# Patient Record
Sex: MALE | Race: WHITE | NOT HISPANIC OR LATINO | Employment: FULL TIME | URBAN - METROPOLITAN AREA
[De-identification: names, ages, dates, MRNs, and addresses within clinical notes are randomized per-mention and may not be internally consistent; named-entity substitution may affect disease eponyms.]

---

## 2023-04-12 ENCOUNTER — TELEPHONE (OUTPATIENT)
Dept: PRIMARY CARE | Facility: CLINIC | Age: 28
End: 2023-04-12

## 2023-04-12 DIAGNOSIS — J45.20 MILD INTERMITTENT COLD-INDUCED ASTHMA WITHOUT COMPLICATION (HHS-HCC): Primary | ICD-10-CM

## 2023-04-12 PROBLEM — J45.909: Status: ACTIVE | Noted: 2023-04-12

## 2023-04-12 RX ORDER — ALBUTEROL SULFATE 90 UG/1
2 AEROSOL, METERED RESPIRATORY (INHALATION) EVERY 4 HOURS PRN
Qty: 8 G | Refills: 0 | Status: SHIPPED | OUTPATIENT
Start: 2023-04-12 | End: 2024-04-30 | Stop reason: SDUPTHER

## 2023-06-20 DIAGNOSIS — F41.9 ANXIETY: Primary | ICD-10-CM

## 2023-06-20 RX ORDER — VENLAFAXINE HYDROCHLORIDE 37.5 MG/1
1 CAPSULE, EXTENDED RELEASE ORAL DAILY
COMMUNITY
Start: 2023-03-29 | End: 2023-06-20 | Stop reason: SDUPTHER

## 2023-06-20 RX ORDER — VENLAFAXINE HYDROCHLORIDE 37.5 MG/1
37.5 CAPSULE, EXTENDED RELEASE ORAL DAILY
Qty: 90 CAPSULE | Refills: 1 | Status: SHIPPED | OUTPATIENT
Start: 2023-06-20 | End: 2023-08-16 | Stop reason: SDUPTHER

## 2023-06-21 DIAGNOSIS — J45.20 MILD INTERMITTENT COLD-INDUCED ASTHMA WITHOUT COMPLICATION (HHS-HCC): ICD-10-CM

## 2023-06-21 DIAGNOSIS — F41.9 ANXIETY: ICD-10-CM

## 2023-06-21 RX ORDER — ALBUTEROL SULFATE 90 UG/1
2 AEROSOL, METERED RESPIRATORY (INHALATION) EVERY 4 HOURS PRN
Qty: 8 G | Refills: 0 | Status: CANCELLED | OUTPATIENT
Start: 2023-06-21 | End: 2024-06-20

## 2023-06-21 RX ORDER — VENLAFAXINE HYDROCHLORIDE 37.5 MG/1
37.5 CAPSULE, EXTENDED RELEASE ORAL DAILY
Qty: 90 CAPSULE | Refills: 1 | OUTPATIENT
Start: 2023-06-21

## 2023-08-16 DIAGNOSIS — F41.9 ANXIETY: ICD-10-CM

## 2023-08-16 RX ORDER — VENLAFAXINE HYDROCHLORIDE 37.5 MG/1
37.5 CAPSULE, EXTENDED RELEASE ORAL DAILY
Qty: 30 CAPSULE | Refills: 0 | Status: SHIPPED | OUTPATIENT
Start: 2023-08-16 | End: 2023-08-31 | Stop reason: SDUPTHER

## 2023-08-29 DIAGNOSIS — F41.9 ANXIETY: ICD-10-CM

## 2023-08-30 RX ORDER — VENLAFAXINE HYDROCHLORIDE 37.5 MG/1
CAPSULE, EXTENDED RELEASE ORAL
Qty: 30 CAPSULE | Refills: 0 | OUTPATIENT
Start: 2023-08-30

## 2023-08-31 RX ORDER — VENLAFAXINE HYDROCHLORIDE 37.5 MG/1
37.5 CAPSULE, EXTENDED RELEASE ORAL DAILY
Qty: 30 CAPSULE | Refills: 0 | Status: SHIPPED | OUTPATIENT
Start: 2023-08-31 | End: 2024-01-02 | Stop reason: WASHOUT

## 2023-09-14 ENCOUNTER — OFFICE VISIT (OUTPATIENT)
Dept: PRIMARY CARE | Facility: CLINIC | Age: 28
End: 2023-09-14
Payer: COMMERCIAL

## 2023-09-14 VITALS
OXYGEN SATURATION: 99 % | SYSTOLIC BLOOD PRESSURE: 102 MMHG | BODY MASS INDEX: 26.62 KG/M2 | HEART RATE: 74 BPM | TEMPERATURE: 97.5 F | HEIGHT: 67 IN | DIASTOLIC BLOOD PRESSURE: 70 MMHG | WEIGHT: 169.6 LBS

## 2023-09-14 DIAGNOSIS — F32.0 DEPRESSION, MAJOR, SINGLE EPISODE, MILD (CMS-HCC): Primary | ICD-10-CM

## 2023-09-14 DIAGNOSIS — F41.1 GENERALIZED ANXIETY DISORDER: ICD-10-CM

## 2023-09-14 PROBLEM — E78.2 HYPERLIPEMIA, MIXED: Status: ACTIVE | Noted: 2023-09-14

## 2023-09-14 PROCEDURE — 1036F TOBACCO NON-USER: CPT | Performed by: FAMILY MEDICINE

## 2023-09-14 PROCEDURE — 99212 OFFICE O/P EST SF 10 MIN: CPT | Performed by: FAMILY MEDICINE

## 2023-09-14 RX ORDER — ESCITALOPRAM OXALATE 10 MG/1
10 TABLET ORAL DAILY
Qty: 30 TABLET | Refills: 1 | Status: SHIPPED | OUTPATIENT
Start: 2023-09-14 | End: 2023-12-18

## 2023-09-14 ASSESSMENT — PATIENT HEALTH QUESTIONNAIRE - PHQ9
SUM OF ALL RESPONSES TO PHQ9 QUESTIONS 1 AND 2: 2
2. FEELING DOWN, DEPRESSED OR HOPELESS: SEVERAL DAYS
1. LITTLE INTEREST OR PLEASURE IN DOING THINGS: SEVERAL DAYS

## 2023-09-14 ASSESSMENT — PAIN SCALES - GENERAL: PAINLEVEL: 0-NO PAIN

## 2023-09-14 NOTE — PATIENT INSTRUCTIONS
Continue with therapist.  Will plan to  switch to escitalopram 10 mg.  Start taking venlafaxine 37.5 mg daily  (if needed can alternate with 75 mg for a week or two).   After a week, of 37.5 mg daily, stop it, and start taking 1/2 of the [id number] mg daily.  Take the 1/2  tab for 4 days, then increase to whole  pill.  If doing OK, plan to follow up in office in 6 weeks.  Reach out sooner if problems.

## 2023-09-14 NOTE — PROGRESS NOTES
"Subjective   Patient ID: Yadiel Osborne is a 28 y.o. male who presents for Med Management (Venlafaxine ).  Started tapering dose of effexor  in February.  Had some withdrawal,but was doing OK.  Started seeing a girl, but  broke up in May or June.  Got into a very bad  depression and went up to 75 mg.  Lasted for couple months, and is better than was, but not back to where he had been.  Higher dose of effexor gave side effects.  Had  felt better on lexapro in the past.  It didn't control anxiety well, so that's why  it was changed.  But effexor not doing any better.    Got new job at  [employer]            Review of Systems    Objective   /70 (BP Location: Right arm, Patient Position: Sitting, BP Cuff Size: Large adult)   Pulse 74   Temp 36.4 °C (97.5 °F) (Temporal)   Ht 1.702 m (5' 7\")   Wt 76.9 kg (169 lb 9.6 oz)   SpO2 99%   BMI 26.56 kg/m²     Physical Exam  Vitals reviewed.   Constitutional:       Appearance: Normal appearance.   Neurological:      Mental Status: He is alert.   Psychiatric:         Mood and Affect: Mood normal.         Behavior: Behavior normal.         Thought Content: Thought content normal.         Judgment: Judgment normal.           Assessment/Plan   Problem List Items Addressed This Visit       Depression, major, single episode, mild (CMS/HCC) - Primary    Relevant Medications    escitalopram (Lexapro) 10 mg tablet    Generalized anxiety disorder    Relevant Medications    escitalopram (Lexapro) 10 mg tablet          "

## 2023-12-07 DIAGNOSIS — R21 RASH: Primary | ICD-10-CM

## 2023-12-07 RX ORDER — NYSTATIN AND TRIAMCINOLONE ACETONIDE 100000; 1 [USP'U]/G; MG/G
CREAM TOPICAL 2 TIMES DAILY
Qty: 30 G | Refills: 0 | Status: SHIPPED | OUTPATIENT
Start: 2023-12-07 | End: 2024-01-02 | Stop reason: WASHOUT

## 2024-01-02 ENCOUNTER — PROCEDURE VISIT (OUTPATIENT)
Dept: NEUROLOGY | Facility: CLINIC | Age: 29
End: 2024-01-02
Payer: COMMERCIAL

## 2024-01-02 VITALS
DIASTOLIC BLOOD PRESSURE: 78 MMHG | SYSTOLIC BLOOD PRESSURE: 141 MMHG | HEART RATE: 77 BPM | WEIGHT: 163 LBS | HEIGHT: 67 IN | BODY MASS INDEX: 25.58 KG/M2

## 2024-01-02 DIAGNOSIS — G24.1: Primary | ICD-10-CM

## 2024-01-02 PROCEDURE — 95984 ALYS BRN NPGT PRGRMG ADDL 15: CPT | Performed by: PSYCHIATRY & NEUROLOGY

## 2024-01-02 PROCEDURE — 99215 OFFICE O/P EST HI 40 MIN: CPT | Performed by: PSYCHIATRY & NEUROLOGY

## 2024-01-02 PROCEDURE — 95983 ALYS BRN NPGT PRGRMG 15 MIN: CPT | Performed by: PSYCHIATRY & NEUROLOGY

## 2024-01-02 RX ORDER — MULTIVITAMIN
1 TABLET ORAL DAILY
COMMUNITY
End: 2024-04-10 | Stop reason: ALTCHOICE

## 2024-01-02 RX ORDER — PREDNISONE 20 MG/1
20 TABLET ORAL
COMMUNITY
Start: 2023-08-08 | End: 2024-01-02 | Stop reason: WASHOUT

## 2024-01-02 RX ORDER — CITALOPRAM 40 MG/1
1 TABLET, FILM COATED ORAL DAILY
COMMUNITY
End: 2024-01-02 | Stop reason: WASHOUT

## 2024-01-02 RX ORDER — VENLAFAXINE HYDROCHLORIDE 150 MG/1
150 CAPSULE, EXTENDED RELEASE ORAL
COMMUNITY
End: 2024-01-02 | Stop reason: WASHOUT

## 2024-01-02 RX ORDER — ASPIRIN 325 MG
1 TABLET, DELAYED RELEASE (ENTERIC COATED) ORAL DAILY
COMMUNITY

## 2024-01-02 RX ORDER — BUDESONIDE AND FORMOTEROL FUMARATE DIHYDRATE 80; 4.5 UG/1; UG/1
2 AEROSOL RESPIRATORY (INHALATION)
COMMUNITY
Start: 2023-04-21 | End: 2024-01-02 | Stop reason: WASHOUT

## 2024-01-02 RX ORDER — AMOXICILLIN 500 MG
CAPSULE ORAL
COMMUNITY
End: 2024-04-10 | Stop reason: ALTCHOICE

## 2024-01-02 ASSESSMENT — PATIENT HEALTH QUESTIONNAIRE - PHQ9
2. FEELING DOWN, DEPRESSED OR HOPELESS: SEVERAL DAYS
10. IF YOU CHECKED OFF ANY PROBLEMS, HOW DIFFICULT HAVE THESE PROBLEMS MADE IT FOR YOU TO DO YOUR WORK, TAKE CARE OF THINGS AT HOME, OR GET ALONG WITH OTHER PEOPLE: NOT DIFFICULT AT ALL
SUM OF ALL RESPONSES TO PHQ9 QUESTIONS 1 & 2: 1
1. LITTLE INTEREST OR PLEASURE IN DOING THINGS: NOT AT ALL

## 2024-01-02 ASSESSMENT — ANXIETY QUESTIONNAIRES
7. FEELING AFRAID AS IF SOMETHING AWFUL MIGHT HAPPEN: NOT AT ALL
4. TROUBLE RELAXING: SEVERAL DAYS
2. NOT BEING ABLE TO STOP OR CONTROL WORRYING: SEVERAL DAYS
6. BECOMING EASILY ANNOYED OR IRRITABLE: SEVERAL DAYS
5. BEING SO RESTLESS THAT IT IS HARD TO SIT STILL: SEVERAL DAYS
1. FEELING NERVOUS, ANXIOUS, OR ON EDGE: SEVERAL DAYS
GAD7 TOTAL SCORE: 6
IF YOU CHECKED OFF ANY PROBLEMS ON THIS QUESTIONNAIRE, HOW DIFFICULT HAVE THESE PROBLEMS MADE IT FOR YOU TO DO YOUR WORK, TAKE CARE OF THINGS AT HOME, OR GET ALONG WITH OTHER PEOPLE: NOT DIFFICULT AT ALL
3. WORRYING TOO MUCH ABOUT DIFFERENT THINGS: SEVERAL DAYS

## 2024-01-02 ASSESSMENT — PAIN SCALES - GENERAL: PAINLEVEL: 2

## 2024-01-02 ASSESSMENT — LIFESTYLE VARIABLES
HOW MANY STANDARD DRINKS CONTAINING ALCOHOL DO YOU HAVE ON A TYPICAL DAY: 1 OR 2
AUDIT-C TOTAL SCORE: 1
SKIP TO QUESTIONS 9-10: 1
HOW OFTEN DO YOU HAVE SIX OR MORE DRINKS ON ONE OCCASION: NEVER
HOW OFTEN DO YOU HAVE A DRINK CONTAINING ALCOHOL: MONTHLY OR LESS

## 2024-01-02 NOTE — LETTER
"January 2, 2024     Cathi Kiran MD  [address]  [address]  [facility] [address]    Patient: Yadiel Osborne   YOB: 1995   Date of Visit: 1/2/2024       Dear Dr. Cathi Kiran MD:    Thank you for referring aYdiel Osborne to me for evaluation. Below are my notes for this consultation.  If you have questions, please do not hesitate to call me. I look forward to following your patient along with you.       Sincerely,     Arabella Posadas MD      CC: No Recipients  ______________________________________________________________________________________    Subjective    Yadiel Osborne is a right handed  28 y.o. year old male who presents with No chief complaint on file..   Visit type: new patient visit   DYT1 dystonia.     He had onset of symptoms of dystonia that started around the age  of 10, started with L foot dystonia/abnormal walking. He quickly developed worsening L sided symptoms, as well as trunk. He could barely sit up or even walk.     He had bilateral DBS in [place] at age 13, had infection at first surgery, needed explantation and antibiotic therapy, followed by re-implantation.  IPG replacement performed in 2017 at  w Dr Trimble and Dr Laurent, followed w Dr Laurent till 2017, more recently has been seen by Dr Guthrie - 2021 had IPG replaced with Dr. Trimble.    For years he has felt that his walking has been off. There are good days and bad days. Sometimes he feels like \"he has less control\", more shuffling, and also some leg swinging, Feels that the L side of his body is most affected -  he feels that the  left leg is limping a little.     His walking feels worse when he is on the spot feels that his body stiffens and he freezes on the spot.     Does feel that anxiety, caffeine and stress worsen his symptoms.     L arm will sometimes for example when he brushes his teeth - he does not have the normal control. No twisting or contorsion.   He notices tensing of his lower back muscles.     He feels " that he is tensing up his arms when typing.     He has to charge every week.   He works in a retail job, he is on his feet and walking all day.       Maternal Grandmother had dystonia,       Patient Active Problem List   Diagnosis   • Asthma, cold induced   • Depression, major, single episode, mild (CMS/HCC)   • Generalized anxiety disorder   • Hyperlipemia, mixed      No past medical history on file.   Past Surgical History:   Procedure Laterality Date   • OTHER SURGICAL HISTORY  07/24/2013    Brain Surgery   • OTHER SURGICAL HISTORY  05/26/2021    Deep brain stimulation      Social History     Socioeconomic History   • Marital status: Single     Spouse name: Not on file   • Number of children: Not on file   • Years of education: Not on file   • Highest education level: Not on file   Occupational History   • Not on file   Tobacco Use   • Smoking status: Never     Passive exposure: Never   • Smokeless tobacco: Never   Substance and Sexual Activity   • Alcohol use: Not on file   • Drug use: Not on file   • Sexual activity: Not on file   Other Topics Concern   • Not on file   Social History Narrative   • Not on file     Social Determinants of Health     Financial Resource Strain: Not on file   Food Insecurity: Not on file   Transportation Needs: Not on file   Physical Activity: Not on file   Stress: Not on file   Social Connections: Not on file   Intimate Partner Violence: Not on file   Housing Stability: Not on file      No family history on file.              Review of Systems  All other system have been reviewed and are negative for complaint.  Objective  Vitals:    01/02/24 1413   BP: 141/78   Pulse: 77      Neurological Exam  Mental Status  Awake, alert and oriented to person, place and time.    Cranial Nerves  CN II: Visual fields full to confrontation.  CN III, IV, VI: Extraocular movements intact bilaterally.  CN V: Facial sensation is normal.  CN VII: Full and symmetric facial movement.  CN VIII: Hearing is  normal.  CN IX, X: Palate elevates symmetrically  CN XI: Shoulder shrug strength is normal.  CN XII: Tongue midline without atrophy or fasciculations.    Motor  Normal muscle bulk throughout. Normal muscle tone. No pronator drift.    Sensory  Light touch is normal in upper and lower extremities. Pinprick is normal in upper and lower extremities.     Reflexes                                            Right                      Left  Brachioradialis                    2+                         2+  Biceps                                 2+                         2+  Triceps                                2+                         2+  Finger flex                           2+                         2+  Hamstring                            2+                         2+  Patellar                                3+                         3+  Achilles                                2+                         2+  Right Plantar: downgoing  Left Plantar: downgoing    Coordination  Right: Finger-to-nose normal. Rapid alternating movement normal. Heel-to-shin normal.Left: Finger-to-nose normal. Rapid alternating movement normal. Heel-to-shin normal.    Gait  Normal casual, toe, heel and tandem gait.    Movement specific exam:  Absence of any dystonia apart from with gait - his L leg is stiffer, and he has reduced plantarflexion of the L leg, and maybe some slight circumduction, able to toe and heel walk as well as walk backwards.   ? Slight bradykinesia w L finger taps only.  No other signs of dystonia.        Procedure note:     L IPG:   Activa RC:  Battery 100%,   Th impedance:722 ohms 3.8mA  Electrode impedances  wnl    L GPI:C+ 2-/2.8V/120us/60Hz      No changes made as no R Hemibody dystonia observed.     R IPG:   Activa RC:   Battery: 100%  Electrode impedances - wnl  Th impedances: 748ohms/4.4mA    Initial settings:  C+2-/3.3V/120us/70Hz      Tried reducing it 2.8V to see if helps his leg stiffness (? Assess if  stiffness due to corticospinal spread?) No clear improvement - so went up to 4,0V felt more tremulous  in L arm, suggestive of corticospinal spread - so went donwn to 3.7- walking is stiffer. Went back to 3.3V.     Tried C+1-2-/2.8V /120us/70Hz  - with this setting he appeared less stiff in L leg when ambulating, although still has some mild circumduction.       Final settings - new group  Group B:  C+1-2-/2.8V/120Us/70Hz    Old settings:  Group A:   C+2-/3.3V/120us/70Hz      TSH   Date Value Ref Range Status   06/22/2021 1.74 0.44 - 3.98 mIU/L Final     Comment:      TSH testing is performed using different testing    methodology at [facility] than at other    [facility] hospitals. Direct result comparisons should    only be made within the same method.             Assessment/Plan      Yadiel Osborne is a 28 y.o. year old male here for follow up and programming of his Bilateral Gpi DBS for his DYT1 generalized dystonia. He has had many years of excellent symptom control, however now feels that his gait has changed, and also has a feeling of less fluid or natural movements with his L hemibody. Today we focused our programming on his L hemibody- firstly assessing to see if the stiffness is due to overstimulation or also ? Parkinsonism (bradykinesia and gait changes have been reported w b/l GPI DBS in Dystonia) however this was not present.   I decided to make a new group, adding a new contact, to see is a wider field of stimulation will improve the dystonia. His leg did feel looser, although symptoms are not resolved, and dystonia response to neuromodulation is typically gradual. Will have him return to clinic in near future, to do a monopolar review of his R Gpi/L hemibody to assess for other contact configurations. He is also on low frequency stimulation, so 130 Hz can also be attempted.     Plan:    Today I changed the settings for your L body/ R brain - I created a new Group B, your old settings are  group A.   I did not change the settings at all for the R Body/L brain.     Try this new group for at least 2+ weeks - and then the next time you come in, bring your patient controller and we can continue to work on the L side of the body.     We will call you to schedule a follow up appointment on Thursday in 4-6 weeks.

## 2024-01-02 NOTE — PROGRESS NOTES
"Subjective     Yadiel Osborne is a right handed  28 y.o. year old male who presents with No chief complaint on file..   Visit type: new patient visit   DYT1 dystonia.     He had onset of symptoms of dystonia that started around the age  of 10, started with L foot dystonia/abnormal walking. He quickly developed worsening L sided symptoms, as well as trunk. He could barely sit up or even walk.     He had bilateral DBS in [place] at age 13, had infection at first surgery, needed explantation and antibiotic therapy, followed by re-implantation.  IPG replacement performed in 2017 at  w Dr Trimble and Dr Laurent, followed w Dr Laurent till 2017, more recently has been seen by Dr Guthrie - 2021 had IPG replaced with Dr. Trimble.    For years he has felt that his walking has been off. There are good days and bad days. Sometimes he feels like \"he has less control\", more shuffling, and also some leg swinging, Feels that the L side of his body is most affected -  he feels that the  left leg is limping a little.     His walking feels worse when he is on the spot feels that his body stiffens and he freezes on the spot.     Does feel that anxiety, caffeine and stress worsen his symptoms.     L arm will sometimes for example when he brushes his teeth - he does not have the normal control. No twisting or contorsion.   He notices tensing of his lower back muscles.     He feels that he is tensing up his arms when typing.     He has to charge every week.   He works in a retail job, he is on his feet and walking all day.       Maternal Grandmother had dystonia,       Patient Active Problem List   Diagnosis    Asthma, cold induced    Depression, major, single episode, mild (CMS/HCC)    Generalized anxiety disorder    Hyperlipemia, mixed      No past medical history on file.   Past Surgical History:   Procedure Laterality Date    OTHER SURGICAL HISTORY  07/24/2013    Brain Surgery    OTHER SURGICAL HISTORY  05/26/2021    Deep brain " stimulation      Social History     Socioeconomic History    Marital status: Single     Spouse name: Not on file    Number of children: Not on file    Years of education: Not on file    Highest education level: Not on file   Occupational History    Not on file   Tobacco Use    Smoking status: Never     Passive exposure: Never    Smokeless tobacco: Never   Substance and Sexual Activity    Alcohol use: Not on file    Drug use: Not on file    Sexual activity: Not on file   Other Topics Concern    Not on file   Social History Narrative    Not on file     Social Determinants of Health     Financial Resource Strain: Not on file   Food Insecurity: Not on file   Transportation Needs: Not on file   Physical Activity: Not on file   Stress: Not on file   Social Connections: Not on file   Intimate Partner Violence: Not on file   Housing Stability: Not on file      No family history on file.              Review of Systems  All other system have been reviewed and are negative for complaint.  Objective   Vitals:    01/02/24 1413   BP: 141/78   Pulse: 77      Neurological Exam  Mental Status  Awake, alert and oriented to person, place and time.    Cranial Nerves  CN II: Visual fields full to confrontation.  CN III, IV, VI: Extraocular movements intact bilaterally.  CN V: Facial sensation is normal.  CN VII: Full and symmetric facial movement.  CN VIII: Hearing is normal.  CN IX, X: Palate elevates symmetrically  CN XI: Shoulder shrug strength is normal.  CN XII: Tongue midline without atrophy or fasciculations.    Motor  Normal muscle bulk throughout. Normal muscle tone. No pronator drift.    Sensory  Light touch is normal in upper and lower extremities. Pinprick is normal in upper and lower extremities.     Reflexes                                            Right                      Left  Brachioradialis                    2+                         2+  Biceps                                 2+                          2+  Triceps                                2+                         2+  Finger flex                           2+                         2+  Hamstring                            2+                         2+  Patellar                                3+                         3+  Achilles                                2+                         2+  Right Plantar: downgoing  Left Plantar: downgoing    Coordination  Right: Finger-to-nose normal. Rapid alternating movement normal. Heel-to-shin normal.Left: Finger-to-nose normal. Rapid alternating movement normal. Heel-to-shin normal.    Gait  Normal casual, toe, heel and tandem gait.    Movement specific exam:  Absence of any dystonia apart from with gait - his L leg is stiffer, and he has reduced plantarflexion of the L leg, and maybe some slight circumduction, able to toe and heel walk as well as walk backwards.   ? Slight bradykinesia w L finger taps only.  No other signs of dystonia.        Procedure note:     L IPG:   Activa RC:  Battery 100%,   Th impedance:722 ohms 3.8mA  Electrode impedances  wnl    L GPI:C+ 2-/2.8V/120us/60Hz      No changes made as no R Hemibody dystonia observed.     R IPG:   Activa RC:   Battery: 100%  Electrode impedances - wnl  Th impedances: 748ohms/4.4mA    Initial settings:  C+2-/3.3V/120us/70Hz      Tried reducing it 2.8V to see if helps his leg stiffness (? Assess if stiffness due to corticospinal spread?) No clear improvement - so went up to 4,0V felt more tremulous  in L arm, suggestive of corticospinal spread - so went donwn to 3.7- walking is stiffer. Went back to 3.3V.     Tried C+1-2-/2.8V /120us/70Hz  - with this setting he appeared less stiff in L leg when ambulating, although still has some mild circumduction.       Final settings - new group  Group B:  C+1-2-/2.8V/120Us/70Hz    Old settings:  Group A:   C+2-/3.3V/120us/70Hz      TSH   Date Value Ref Range Status   06/22/2021 1.74 0.44 - 3.98 mIU/L Final     Comment:       TSH testing is performed using different testing    methodology at [facility] than at other    [facility]. Direct result comparisons should    only be made within the same method.             Assessment/Plan       Yadiel Osborne is a 28 y.o. year old male here for follow up and programming of his Bilateral Gpi DBS for his DYT1 generalized dystonia. He has had many years of excellent symptom control, however now feels that his gait has changed, and also has a feeling of less fluid or natural movements with his L hemibody. Today we focused our programming on his L hemibody- firstly assessing to see if the stiffness is due to overstimulation or also ? Parkinsonism (bradykinesia and gait changes have been reported w b/l GPI DBS in Dystonia) however this was not present.   I decided to make a new group, adding a new contact, to see is a wider field of stimulation will improve the dystonia. His leg did feel looser, although symptoms are not resolved, and dystonia response to neuromodulation is typically gradual. Will have him return to clinic in near future, to do a monopolar review of his R Gpi/L hemibody to assess for other contact configurations. He is also on low frequency stimulation, so 130 Hz can also be attempted.     Plan:    Today I changed the settings for your L body/ R brain - I created a new Group B, your old settings are group A.   I did not change the settings at all for the R Body/L brain.     Try this new group for at least 2+ weeks - and then the next time you come in, bring your patient controller and we can continue to work on the L side of the body.     We will call you to schedule a follow up appointment on Thursday in 4-6 weeks.

## 2024-02-05 ENCOUNTER — OFFICE VISIT (OUTPATIENT)
Dept: PRIMARY CARE | Facility: CLINIC | Age: 29
End: 2024-02-05
Payer: COMMERCIAL

## 2024-02-05 VITALS
WEIGHT: 165 LBS | HEART RATE: 67 BPM | OXYGEN SATURATION: 98 % | TEMPERATURE: 98.1 F | HEIGHT: 67 IN | DIASTOLIC BLOOD PRESSURE: 80 MMHG | BODY MASS INDEX: 25.9 KG/M2 | SYSTOLIC BLOOD PRESSURE: 125 MMHG

## 2024-02-05 DIAGNOSIS — Z20.2 ENCOUNTER FOR ASSESSMENT OF STD EXPOSURE: ICD-10-CM

## 2024-02-05 DIAGNOSIS — Z00.00 ROUTINE GENERAL MEDICAL EXAMINATION AT A HEALTH CARE FACILITY: ICD-10-CM

## 2024-02-05 DIAGNOSIS — R41.9 COGNITIVE COMPLAINTS: ICD-10-CM

## 2024-02-05 DIAGNOSIS — J45.20 MILD INTERMITTENT COLD-INDUCED ASTHMA WITHOUT COMPLICATION (HHS-HCC): ICD-10-CM

## 2024-02-05 DIAGNOSIS — F41.1 GENERALIZED ANXIETY DISORDER: ICD-10-CM

## 2024-02-05 DIAGNOSIS — R53.83 OTHER FATIGUE: ICD-10-CM

## 2024-02-05 DIAGNOSIS — F32.0 DEPRESSION, MAJOR, SINGLE EPISODE, MILD (CMS-HCC): Primary | ICD-10-CM

## 2024-02-05 LAB
ALBUMIN SERPL BCP-MCNC: 4.6 G/DL (ref 3.4–5)
ALP SERPL-CCNC: 61 U/L (ref 33–120)
ALT SERPL W P-5'-P-CCNC: 23 U/L (ref 10–52)
ANION GAP SERPL CALC-SCNC: 10 MMOL/L (ref 10–20)
AST SERPL W P-5'-P-CCNC: 21 U/L (ref 9–39)
BILIRUB SERPL-MCNC: 1 MG/DL (ref 0–1.2)
BUN SERPL-MCNC: 18 MG/DL (ref 6–23)
CALCIUM SERPL-MCNC: 9.7 MG/DL (ref 8.6–10.6)
CHLORIDE SERPL-SCNC: 104 MMOL/L (ref 98–107)
CHOLEST SERPL-MCNC: 153 MG/DL (ref 0–199)
CHOLESTEROL/HDL RATIO: 2.6
CO2 SERPL-SCNC: 32 MMOL/L (ref 21–32)
CREAT SERPL-MCNC: 0.8 MG/DL (ref 0.5–1.3)
EGFRCR SERPLBLD CKD-EPI 2021: >90 ML/MIN/1.73M*2
ERYTHROCYTE [DISTWIDTH] IN BLOOD BY AUTOMATED COUNT: 12 % (ref 11.5–14.5)
GLUCOSE SERPL-MCNC: 95 MG/DL (ref 74–99)
HCT VFR BLD AUTO: 43.2 % (ref 41–52)
HCV AB SER QL: NONREACTIVE
HDLC SERPL-MCNC: 58.2 MG/DL
HGB BLD-MCNC: 14.5 G/DL (ref 13.5–17.5)
HIV 1+2 AB+HIV1 P24 AG SERPL QL IA: NONREACTIVE
LDLC SERPL CALC-MCNC: 84 MG/DL
MCH RBC QN AUTO: 28.6 PG (ref 26–34)
MCHC RBC AUTO-ENTMCNC: 33.6 G/DL (ref 32–36)
MCV RBC AUTO: 85 FL (ref 80–100)
NON HDL CHOLESTEROL: 95 MG/DL (ref 0–149)
NRBC BLD-RTO: 0 /100 WBCS (ref 0–0)
PLATELET # BLD AUTO: 164 X10*3/UL (ref 150–450)
POTASSIUM SERPL-SCNC: 4.9 MMOL/L (ref 3.5–5.3)
PROT SERPL-MCNC: 6.7 G/DL (ref 6.4–8.2)
RBC # BLD AUTO: 5.07 X10*6/UL (ref 4.5–5.9)
SODIUM SERPL-SCNC: 141 MMOL/L (ref 136–145)
T4 FREE SERPL-MCNC: 1.56 NG/DL (ref 0.78–1.48)
TREPONEMA PALLIDUM IGG+IGM AB [PRESENCE] IN SERUM OR PLASMA BY IMMUNOASSAY: NONREACTIVE
TRIGL SERPL-MCNC: 56 MG/DL (ref 0–149)
TSH SERPL-ACNC: 0.01 MIU/L (ref 0.44–3.98)
VLDL: 11 MG/DL (ref 0–40)
WBC # BLD AUTO: 7.1 X10*3/UL (ref 4.4–11.3)

## 2024-02-05 PROCEDURE — 1036F TOBACCO NON-USER: CPT | Performed by: FAMILY MEDICINE

## 2024-02-05 PROCEDURE — 85027 COMPLETE CBC AUTOMATED: CPT

## 2024-02-05 PROCEDURE — 86803 HEPATITIS C AB TEST: CPT

## 2024-02-05 PROCEDURE — 84439 ASSAY OF FREE THYROXINE: CPT

## 2024-02-05 PROCEDURE — 99214 OFFICE O/P EST MOD 30 MIN: CPT | Performed by: FAMILY MEDICINE

## 2024-02-05 PROCEDURE — 87661 TRICHOMONAS VAGINALIS AMPLIF: CPT

## 2024-02-05 PROCEDURE — 87800 DETECT AGNT MULT DNA DIREC: CPT

## 2024-02-05 PROCEDURE — 82607 VITAMIN B-12: CPT

## 2024-02-05 PROCEDURE — 80061 LIPID PANEL: CPT

## 2024-02-05 PROCEDURE — 36415 COLL VENOUS BLD VENIPUNCTURE: CPT

## 2024-02-05 PROCEDURE — 84443 ASSAY THYROID STIM HORMONE: CPT

## 2024-02-05 PROCEDURE — 80053 COMPREHEN METABOLIC PANEL: CPT

## 2024-02-05 PROCEDURE — 87389 HIV-1 AG W/HIV-1&-2 AB AG IA: CPT

## 2024-02-05 PROCEDURE — 84402 ASSAY OF FREE TESTOSTERONE: CPT

## 2024-02-05 PROCEDURE — 86780 TREPONEMA PALLIDUM: CPT

## 2024-02-05 RX ORDER — ESCITALOPRAM OXALATE 20 MG/1
20 TABLET ORAL DAILY
Qty: 90 TABLET | Refills: 0 | Status: SHIPPED | OUTPATIENT
Start: 2024-02-05 | End: 2024-02-09 | Stop reason: SDUPTHER

## 2024-02-05 ASSESSMENT — PATIENT HEALTH QUESTIONNAIRE - PHQ9
3. TROUBLE FALLING OR STAYING ASLEEP OR SLEEPING TOO MUCH: SEVERAL DAYS
4. FEELING TIRED OR HAVING LITTLE ENERGY: SEVERAL DAYS
6. FEELING BAD ABOUT YOURSELF - OR THAT YOU ARE A FAILURE OR HAVE LET YOURSELF OR YOUR FAMILY DOWN: SEVERAL DAYS
5. POOR APPETITE OR OVEREATING: SEVERAL DAYS
8. MOVING OR SPEAKING SO SLOWLY THAT OTHER PEOPLE COULD HAVE NOTICED. OR THE OPPOSITE, BEING SO FIGETY OR RESTLESS THAT YOU HAVE BEEN MOVING AROUND A LOT MORE THAN USUAL: SEVERAL DAYS
SUM OF ALL RESPONSES TO PHQ9 QUESTIONS 1 AND 2: 1
9. THOUGHTS THAT YOU WOULD BE BETTER OFF DEAD, OR OF HURTING YOURSELF: SEVERAL DAYS
2. FEELING DOWN, DEPRESSED OR HOPELESS: SEVERAL DAYS
7. TROUBLE CONCENTRATING ON THINGS, SUCH AS READING THE NEWSPAPER OR WATCHING TELEVISION: SEVERAL DAYS
1. LITTLE INTEREST OR PLEASURE IN DOING THINGS: NOT AT ALL
SUM OF ALL RESPONSES TO PHQ QUESTIONS 1-9: 8

## 2024-02-05 ASSESSMENT — ANXIETY QUESTIONNAIRES
5. BEING SO RESTLESS THAT IT IS HARD TO SIT STILL: SEVERAL DAYS
GAD7 TOTAL SCORE: 13
7. FEELING AFRAID AS IF SOMETHING AWFUL MIGHT HAPPEN: SEVERAL DAYS
6. BECOMING EASILY ANNOYED OR IRRITABLE: SEVERAL DAYS
3. WORRYING TOO MUCH ABOUT DIFFERENT THINGS: NEARLY EVERY DAY
IF YOU CHECKED OFF ANY PROBLEMS ON THIS QUESTIONNAIRE, HOW DIFFICULT HAVE THESE PROBLEMS MADE IT FOR YOU TO DO YOUR WORK, TAKE CARE OF THINGS AT HOME, OR GET ALONG WITH OTHER PEOPLE: SOMEWHAT DIFFICULT
4. TROUBLE RELAXING: SEVERAL DAYS
1. FEELING NERVOUS, ANXIOUS, OR ON EDGE: NEARLY EVERY DAY
2. NOT BEING ABLE TO STOP OR CONTROL WORRYING: NEARLY EVERY DAY

## 2024-02-05 ASSESSMENT — ENCOUNTER SYMPTOMS
LOSS OF SENSATION IN FEET: 0
OCCASIONAL FEELINGS OF UNSTEADINESS: 0
DEPRESSION: 0

## 2024-02-05 NOTE — PATIENT INSTRUCTIONS
Ongoing  depression and anxiety.  No worse off of the Effexor, but still symptoms.  Continue with therapist weekly.  Increase lexapro to  20 mg.  Follow up in 1 month.  If symptoms  still not optimally controlled, can consider adding medication.  Also at some point, could see  psychiatrist.    For hand lesion, looks benign.  If bothersome, dermatologist could remove it.    Check blood and urine related to STI exposure, low energy and depression  Will see how things come back.  Keep symptom journal of  gastrointestinal symptoms.  Follow up at appointment in 1 month.

## 2024-02-05 NOTE — PROGRESS NOTES
"Subjective   Patient ID: Yadiel Osborne is a 28 y.o. male who presents for Med Management.  Taking Lexapro 10 mg daily  Seeing a therapist weekly.  Has no  pleasure in life.  Never happy.  Also has anxiety.  No SI.  Not sure if he should feel better, or  if it  is just where  he is in  life.  Never feels well rested, even after sleeping 9 hours.  Tracks sleep  with Fitbit. The sleep seems  OK.  Falls asleep  and stays asleep, but still feels like  crap in AM.  Doesn't snore as far  as  he  knows    Right hand lesion dorsum    Gets lightheaded at times when is up and down at work.  Tries to drink a lot of water.  Having stomach issues, gets epigastric pain at times.  No diarrhea.  Eats a lot of raw seafood.        Review of Systems    Objective   /80   Pulse 67   Temp 36.7 °C (98.1 °F)   Ht 1.702 m (5' 7\")   Wt 74.8 kg (165 lb)   SpO2 98%   BMI 25.84 kg/m²     Physical Exam  Vitals reviewed.   Constitutional:       Appearance: Normal appearance.   Cardiovascular:      Rate and Rhythm: Normal rate and regular rhythm.      Heart sounds: No murmur heard.  Pulmonary:      Effort: Pulmonary effort is normal.      Breath sounds: Normal breath sounds.   Musculoskeletal:      Right lower leg: No edema.      Left lower leg: No edema.   Neurological:      Mental Status: He is alert.   Psychiatric:         Attention and Perception: Attention and perception normal.         Mood and Affect: Mood and affect normal.         Speech: Speech normal.         Behavior: Behavior normal. Behavior is cooperative.         Thought Content: Thought content normal.         Cognition and Memory: Cognition normal.         Judgment: Judgment normal.           Assessment/Plan   Problem List Items Addressed This Visit       Asthma, cold induced    Depression, major, single episode, mild (CMS/HCC) - Primary    Relevant Medications    escitalopram (Lexapro) 20 mg tablet    Other Relevant Orders    Comprehensive Metabolic Panel    TSH with " reflex to Free T4 if abnormal    CBC    Testosterone, total and free    Syphilis Screen with Reflex    Trichomonas vaginalis, Nucleic Acid Detection    Generalized anxiety disorder    Relevant Medications    escitalopram (Lexapro) 20 mg tablet     Other Visit Diagnoses       Other fatigue        Relevant Orders    Comprehensive Metabolic Panel    TSH with reflex to Free T4 if abnormal    CBC    Testosterone, total and free    Syphilis Screen with Reflex    Trichomonas vaginalis, Nucleic Acid Detection    Routine general medical examination at a health care facility        Relevant Orders    Lipid Panel    Encounter for assessment of STD exposure        Relevant Orders    C. Trachomatis / N. Gonorrhoeae, Amplified Detection    Hepatitis C antibody    HIV 1/2 Antigen/Antibody Screen with Reflex to Confirmation

## 2024-02-06 DIAGNOSIS — E05.90 HYPERTHYROIDISM: Primary | ICD-10-CM

## 2024-02-06 LAB
C TRACH RRNA SPEC QL NAA+PROBE: NEGATIVE
N GONORRHOEA DNA SPEC QL PROBE+SIG AMP: NEGATIVE
T VAGINALIS RRNA SPEC QL NAA+PROBE: NEGATIVE

## 2024-02-08 ENCOUNTER — PROCEDURE VISIT (OUTPATIENT)
Dept: NEUROLOGY | Facility: HOSPITAL | Age: 29
End: 2024-02-08
Payer: COMMERCIAL

## 2024-02-08 ENCOUNTER — APPOINTMENT (OUTPATIENT)
Dept: NEUROLOGY | Facility: HOSPITAL | Age: 29
End: 2024-02-08
Payer: COMMERCIAL

## 2024-02-08 DIAGNOSIS — R41.9 COGNITIVE COMPLAINTS: ICD-10-CM

## 2024-02-08 DIAGNOSIS — G24.1: Primary | ICD-10-CM

## 2024-02-08 LAB — VIT B12 SERPL-MCNC: 751 PG/ML (ref 211–911)

## 2024-02-08 PROCEDURE — 95983 ALYS BRN NPGT PRGRMG 15 MIN: CPT | Performed by: NURSE PRACTITIONER

## 2024-02-08 PROCEDURE — 99212 OFFICE O/P EST SF 10 MIN: CPT | Performed by: NURSE PRACTITIONER

## 2024-02-08 PROCEDURE — 95984 ALYS BRN NPGT PRGRMG ADDL 15: CPT | Performed by: NURSE PRACTITIONER

## 2024-02-08 NOTE — PATIENT INSTRUCTIONS
# DBS adjusted today.    Going home on Group C is like B but with higher freq--try this for several week if tolerated to see if more improvement than B.     If you would like, can try group D--new settings to see if dystonia is any better (try for several weeks if tolerated).     Group B is the group you came in on today.     #For cognitive concerns:    Check B12 (added on to recent labs)  Neuropsychology testing     #Follow up in 6 months with Dr. Posadas, see me sooner for DBS if needed     Bartolome Hoffmann, NP-C  Adult/Gerontological Nurse Practitioner   [facility], Department of Neurology  [facility] [place]  [facility]  [address] [id number]  Phone: [phone]  Fax: [phone]

## 2024-02-08 NOTE — PROGRESS NOTES
Subjective     Yadiel Osborne is a 28 y.o. year old male who presents with dystonia, here for follow up visit.    HPI    Dystonia is better on Group B created last visit, some days better than others, some days he has sx on the L side.   He has not made any changes or adjustments at home.     LLE he feels different when he walks.    He dislikes having to charge 2 IPGS, feels like a burden.     Sometimes difficulty with speech/difficult to get words out. Has not noticed any changes with his groups.     He is concerned about memory issues, feels a tension in his brain. For many years he states he has c/o brain fogginess, like a blockage in his brain, impacting his memory, comprehension. This is worrisome to him. Does have anxiety, worried about his neurological condition. Sleeps OK but not sure he is well rested.  Hyperthyroid, will retest in 1 month.                  Current Outpatient Medications:     albuterol (Ventolin HFA) 90 mcg/actuation inhaler, Inhale 2 puffs every 4 hours if needed for wheezing or shortness of breath., Disp: 8 g, Rfl: 0    cholecalciferol (Vitamin D-3) 50,000 unit capsule, Take 1 capsule (50,000 Units) by mouth once daily., Disp: , Rfl:     escitalopram (Lexapro) 20 mg tablet, Take 1 tablet (20 mg) by mouth once daily., Disp: 90 tablet, Rfl: 0    multivitamin tablet, Take 1 tablet by mouth once daily., Disp: , Rfl:     multivitamin tablet, Take 1 tablet by mouth once daily., Disp: , Rfl:     omega-3 fatty acids-fish oil 300-1,000 mg capsule, Take by mouth., Disp: , Rfl:        Objective   There were no vitals filed for this visit.          Physical Exam  Not able to see dystonia on exam--more so how patient felt when he was walking.     Procedure note:  Bilateral DBS rechargeable IPGs-- hour per side once a week    L chest IPG, L GPi  Activa RC:  Battery 100%,   Th impedance:722 ohms 3.8mA  Electrode impedances  wnl     L GPI:C+ 2-/2.8V/120us/60Hz     NOT INTERROGATED TODAY.        R chest  "IPG, R GPi  Activa RC:   Battery: 100%  Electrode impedances OK  Th impedances: 552ohms/4.9mA     Initial settings:  Group B:  C+1-2-/2.8V/120Us/70Hz    Programming  Group B Increasing freq 115 to SE \"lack of control\" on L side , increasing causing vision changes (white noise on a TV/artifact)--lowered PW to 110 and head still felt weird, 90 which was tolerated made this group C, kept B same settings    Higher PW in B makes his head feel weird.     Monopolar review  R GPi (L body)  C+0- SE 1.2--vision changes, speckles   C+1-SE 4.5 L cheek feeling like it wants to pull  C+2-SE 4.5 trouble speaking  C+3- no SE at 5.0     PLAN  Next visit can try C+1- if needed.      Final settings    Group C--same as B but with slightly higher freq 2/8/24  C+1-2- 2.8V/120Us/90Hz    Group B--settings made last visit  1/2/24  C+1-2-/2.8V/120Us/70Hz    Group D--new settings 2/8/24  C+2-3- 2.5/120/70     Group A:   C+2-/3.3V/120us/70Hz      Assessment/Plan   Mr. Yadiel Osborne is a 28 y.o. m here for follow up of bilateral Gpi DBS for his DYT1 generalized dystonia. He has had many years of excellent symptom control. He notes dystonia sx return with DBS off x 1 day and cannot take the sx so turns it back on. Last visit with Dr. Posadas (initial visit with her), his noted his gait has changed, and also has a feeling of less fluid or natural movements with his L hemibody. DBS adjusted for this last visit with improvement--not able to be seen on exam but patient notes most when walking. Dr. Posadas did try lowering stim to check for overstimulation and also for parkinsonism (none) also.   Final DBS settings are group C (same as B he came in on but with slightly higher freq)--he did not tolerate further increases in amp, PW or freq d/t SE. 1-2- are likely the best contacts as he had visual changes quickly on 0-.    Cognitive complaints: This is a concern to him, worry that DBS is causing changes, has had since age 10. DYT1 unlikely to cause " cognitive changes. Did discuss anxiety, depression, insomnia can contribute. He would like to have memory evaluated, will check B12, NP testing. Neuro PE with Dr. Posadas last visit unremarkable except dystonia.   Hyperthyroid on labs without sx and will be rechecked with PCP in 1 month.       Diagnoses and all orders for this visit:  DYT1 dystonia  Cognitive complaints  -     Vitamin B12; Future  -     Referral to Neuropsychology; Future      # DBS adjusted today.    Going home on Group C is like B but with higher freq--try this for several week if tolerated to see if more improvement than B.     If you would like, can try group D--new settings to see if dystonia is any better (try for several weeks if tolerated).     Group B is the group you came in on today.     #For cognitive concerns:    Check B12 (added on to recent labs)  Neuropsychology testing     #Follow up in 6 months with Dr. Posadas, see me sooner for DBS if needed       Total time of 48 minutes for today's visit including chart review, of which 33 mins were spent with DBS programming as noted above-checking settings, stimulator events, energy level, and impedances and updating settings in the chart.        Bartolome Hoffmann, NP-C  Adult/Gerontological Nurse Practitioner   [facility], Department of Neurology  [facility] [place]  [facility]  [address] [place] [address]  Phone: [phone]  Fax: [phone]

## 2024-02-09 DIAGNOSIS — F41.1 GENERALIZED ANXIETY DISORDER: ICD-10-CM

## 2024-02-09 DIAGNOSIS — F32.0 DEPRESSION, MAJOR, SINGLE EPISODE, MILD (CMS-HCC): ICD-10-CM

## 2024-02-09 LAB
TESTOSTERONE FREE (CHAN): 87.2 PG/ML (ref 35–155)
TESTOSTERONE,TOTAL,LC-MS/MS: 507 NG/DL (ref 250–1100)

## 2024-02-09 RX ORDER — ESCITALOPRAM OXALATE 20 MG/1
20 TABLET ORAL DAILY
Qty: 90 TABLET | Refills: 0 | Status: SHIPPED | OUTPATIENT
Start: 2024-02-09 | End: 2024-02-12 | Stop reason: SDUPTHER

## 2024-02-12 ENCOUNTER — TELEPHONE (OUTPATIENT)
Dept: PRIMARY CARE | Facility: CLINIC | Age: 29
End: 2024-02-12
Payer: COMMERCIAL

## 2024-02-12 DIAGNOSIS — F32.0 DEPRESSION, MAJOR, SINGLE EPISODE, MILD (CMS-HCC): ICD-10-CM

## 2024-02-12 DIAGNOSIS — F41.1 GENERALIZED ANXIETY DISORDER: ICD-10-CM

## 2024-02-12 RX ORDER — ESCITALOPRAM OXALATE 20 MG/1
20 TABLET ORAL DAILY
Qty: 90 TABLET | Refills: 0 | Status: SHIPPED | OUTPATIENT
Start: 2024-02-12 | End: 2024-05-08 | Stop reason: SDUPTHER

## 2024-02-22 ENCOUNTER — APPOINTMENT (OUTPATIENT)
Dept: NEUROLOGY | Facility: HOSPITAL | Age: 29
End: 2024-02-22

## 2024-03-06 ENCOUNTER — OFFICE VISIT (OUTPATIENT)
Dept: PRIMARY CARE | Facility: CLINIC | Age: 29
End: 2024-03-06
Payer: COMMERCIAL

## 2024-03-06 VITALS
OXYGEN SATURATION: 98 % | SYSTOLIC BLOOD PRESSURE: 98 MMHG | TEMPERATURE: 97.5 F | DIASTOLIC BLOOD PRESSURE: 70 MMHG | HEART RATE: 58 BPM | WEIGHT: 164.2 LBS | HEIGHT: 67 IN | BODY MASS INDEX: 25.77 KG/M2

## 2024-03-06 DIAGNOSIS — F41.1 GENERALIZED ANXIETY DISORDER: ICD-10-CM

## 2024-03-06 DIAGNOSIS — N52.9 ERECTILE DYSFUNCTION, UNSPECIFIED ERECTILE DYSFUNCTION TYPE: ICD-10-CM

## 2024-03-06 DIAGNOSIS — L98.9 SKIN LESION OF HAND: ICD-10-CM

## 2024-03-06 DIAGNOSIS — E05.90 HYPERTHYROIDISM: ICD-10-CM

## 2024-03-06 DIAGNOSIS — F32.0 DEPRESSION, MAJOR, SINGLE EPISODE, MILD (CMS-HCC): ICD-10-CM

## 2024-03-06 DIAGNOSIS — R53.83 OTHER FATIGUE: Primary | ICD-10-CM

## 2024-03-06 LAB
T3 SERPL-MCNC: 121 NG/DL (ref 60–200)
T4 FREE SERPL-MCNC: 1.46 NG/DL (ref 0.78–1.48)
THYROPEROXIDASE AB SERPL-ACNC: <28 IU/ML
TSH SERPL-ACNC: 0.01 MIU/L (ref 0.44–3.98)

## 2024-03-06 PROCEDURE — 86376 MICROSOMAL ANTIBODY EACH: CPT

## 2024-03-06 PROCEDURE — 36415 COLL VENOUS BLD VENIPUNCTURE: CPT

## 2024-03-06 PROCEDURE — 1036F TOBACCO NON-USER: CPT | Performed by: FAMILY MEDICINE

## 2024-03-06 PROCEDURE — 84439 ASSAY OF FREE THYROXINE: CPT

## 2024-03-06 PROCEDURE — 84443 ASSAY THYROID STIM HORMONE: CPT

## 2024-03-06 PROCEDURE — 84480 ASSAY TRIIODOTHYRONINE (T3): CPT

## 2024-03-06 PROCEDURE — 84270 ASSAY OF SEX HORMONE GLOBUL: CPT

## 2024-03-06 PROCEDURE — 99213 OFFICE O/P EST LOW 20 MIN: CPT | Performed by: FAMILY MEDICINE

## 2024-03-06 ASSESSMENT — ENCOUNTER SYMPTOMS
DEPRESSION: 1
LOSS OF SENSATION IN FEET: 0
OCCASIONAL FEELINGS OF UNSTEADINESS: 0

## 2024-03-06 ASSESSMENT — PATIENT HEALTH QUESTIONNAIRE - PHQ9
10. IF YOU CHECKED OFF ANY PROBLEMS, HOW DIFFICULT HAVE THESE PROBLEMS MADE IT FOR YOU TO DO YOUR WORK, TAKE CARE OF THINGS AT HOME, OR GET ALONG WITH OTHER PEOPLE: SOMEWHAT DIFFICULT
SUM OF ALL RESPONSES TO PHQ9 QUESTIONS 1 AND 2: 2
2. FEELING DOWN, DEPRESSED OR HOPELESS: SEVERAL DAYS
1. LITTLE INTEREST OR PLEASURE IN DOING THINGS: SEVERAL DAYS

## 2024-03-06 NOTE — PROGRESS NOTES
Subjective   Patient ID: Yadiel Osborne is a 28 y.o. male who presents for Follow-up ( Patient Is In For a Follow Up to get Blood work Done. Patient Is Tired all The Time.) and Fatigue.  Lexapro  dose increased to 20 mg daily.  Thinks some things better, some not.  Mood may be  better,but still will have some bad  days.  Still exhausted  and fatigue, with watch reading good sleep.  Not  having  AM erections on  regular basis.  Not in relationship currently.  Still has foggy thinking.  Started with new therapist.        Review of Systems    Objective   There were no vitals taken for this visit.    Physical Exam  Vitals reviewed.   Constitutional:       Appearance: Normal appearance.   Neurological:      Mental Status: He is alert.   Psychiatric:         Mood and Affect: Mood normal.         Behavior: Behavior normal.           Assessment/Plan   Problem List Items Addressed This Visit    None

## 2024-03-06 NOTE — PATIENT INSTRUCTIONS
Depression  and anxiety,  with improvement of  some parameters, but still not  optimal symptom control.  Continue escitalopram 20 mg  daily.  Continue   with therapist.  Recommend  seeing psychiatrist.   Some community  offices include [facility], [facility], and [facility].      Ongoing daytime sleepiness, despite what seems  to be good sleep.  Refer to Sleep Medicine for  further  evaluation.    Thyroid tests  done previously showed hyperthyroidism.  Will recheck today.  If still  off, refer  to endocrinologist.  Will  also check SHBG.

## 2024-03-08 LAB — SHBG SERPL-SCNC: 39 NMOL/L (ref 17–56)

## 2024-03-09 DIAGNOSIS — E05.90 HYPERTHYROIDISM: Primary | ICD-10-CM

## 2024-04-09 ENCOUNTER — TELEPHONE (OUTPATIENT)
Dept: PRIMARY CARE | Facility: CLINIC | Age: 29
End: 2024-04-09
Payer: COMMERCIAL

## 2024-04-09 NOTE — PROGRESS NOTES
"[facility]  New Visit Note        HISTORY OF PRESENT ILLNESS     The patient's referring provider is: Cathi Kiran MD    HISTORY OF PRESENT ILLNESS   Yadiel Osborne is a 29 y.o. male who presents to a [facility] for a sleep medicine evaluation with concerns of Excessive Daytime Sleepiness, Consult, and Unrefreshing Sleep.     PAST SLEEP HISTORY    Patient has the following sleep-related diagnoses and sleep study results: none    CURRENT HISTORY    On today's visit, the patient reports he feels very tired during the day. This started a few years ago.  He does feel this year has been particularly sleepy.    He notes his Fitbit shows he gets good sleep but he still feels very tired.  He denies any issues falling asleep or staying asleep.    He has tried drinking caffeine, changing his diet to be more healthy, and using sleep teas and changing lighting to help. His changes have made a significant difference.    He denies any narcolepsy like symptoms including cataplexy, hypnagogic or hypnopompic hallucinations, or sleep paralysis.    His father has VANNESA and CPAP.    NECK 14.75\"    STOP  2 ST  BANG 1 G    Sleep schedule  on weekdays / work days:  Usual Bedtime  12-2 a  Falls asleep around  12-2 a  Wake time  9-10 a    Sleep schedule  on weekends/non work days :  Usual Bedtime  12-2 a  Falls asleep around 12-2 a  Wake time  9-10 a    Naps:   no    Average sleep duration 7-9 hours/day    Preferred sleeping position: back    Sleep-related ROS:    Sleep Initiation: no problems going to sleep    Sleep Maintenance: denies issues    Breathing during sleep: no symptoms of snoring or concerns of breathing at night    Sleep-related behaviors:  denies    Recreational drug use  Smoking: never  Alcohol consumption: 1-2/week  Caffeine consumption:  occasional  Marijuana: past use    ESS: 8  PO: 12      PHYSICAL EXAM     VITAL SIGNS: /66   Pulse 59   Ht 1.702 m " "(5' 7\")   Wt 77.1 kg (170 lb)   SpO2 97%   BMI 26.63 kg/m²      CURRENT WEIGHT: [unfilled]  BMI: [unfilled]  PREVIOUS WEIGHTS:  Wt Readings from Last 3 Encounters:   04/10/24 77.1 kg (170 lb)   03/06/24 74.5 kg (164 lb 3.2 oz)   02/05/24 74.8 kg (165 lb)       PHYSICAL EXAM: GENERAL: alert oriented x 3 pleasant and cooperative no acute distress  MODIFIED KERN SCORE: 3+  MODIFIED MALLAMPATI SCORE: 3+  LATERAL PHARYNGEAL WALL: 2+  NECK EXAM: normal supple no adenopathy    RESULTS/DATA     No results found for: \"IRON\", \"TRANSFERRIN\", \"IRONSAT\", \"TIBC\", \"FERRITIN\"    ASSESSMENT/PLAN     Mr. Osborne is a 29 y.o. male and was referred to the [facility] for the following issues:    OBSTRUCTIVE SLEEP APNEA / SLEEPINESS/ FATIGUE  -Ordering sleep study  -Discussed symptoms and risks of untreated sleep apnea  -Recommend trying lexapro at night    BMI>26  -Body mass index is 26.63 kg/m².  today  -May benefit from wt loss in terms of sleep apnea    Followup 3 weeks after sleep study to review results        "

## 2024-04-10 ENCOUNTER — LAB (OUTPATIENT)
Dept: LAB | Facility: LAB | Age: 29
End: 2024-04-10
Payer: COMMERCIAL

## 2024-04-10 ENCOUNTER — OFFICE VISIT (OUTPATIENT)
Dept: SLEEP MEDICINE | Facility: CLINIC | Age: 29
End: 2024-04-10
Payer: COMMERCIAL

## 2024-04-10 VITALS
WEIGHT: 170 LBS | HEART RATE: 59 BPM | SYSTOLIC BLOOD PRESSURE: 132 MMHG | DIASTOLIC BLOOD PRESSURE: 66 MMHG | BODY MASS INDEX: 26.68 KG/M2 | OXYGEN SATURATION: 97 % | HEIGHT: 67 IN

## 2024-04-10 DIAGNOSIS — G47.30 SLEEP-RELATED BREATHING DISORDER: Primary | ICD-10-CM

## 2024-04-10 DIAGNOSIS — R53.83 OTHER FATIGUE: ICD-10-CM

## 2024-04-10 DIAGNOSIS — E05.90 HYPERTHYROIDISM: ICD-10-CM

## 2024-04-10 DIAGNOSIS — G47.19 EXCESSIVE DAYTIME SLEEPINESS: ICD-10-CM

## 2024-04-10 LAB
T4 FREE SERPL-MCNC: 1 NG/DL (ref 0.9–1.7)
TSH SERPL DL<=0.05 MIU/L-ACNC: 0.09 MIU/L (ref 0.27–4.2)

## 2024-04-10 PROCEDURE — 36415 COLL VENOUS BLD VENIPUNCTURE: CPT

## 2024-04-10 PROCEDURE — 3008F BODY MASS INDEX DOCD: CPT | Performed by: PHYSICIAN ASSISTANT

## 2024-04-10 PROCEDURE — 1036F TOBACCO NON-USER: CPT | Performed by: PHYSICIAN ASSISTANT

## 2024-04-10 PROCEDURE — 84439 ASSAY OF FREE THYROXINE: CPT

## 2024-04-10 PROCEDURE — 84443 ASSAY THYROID STIM HORMONE: CPT

## 2024-04-10 PROCEDURE — 99213 OFFICE O/P EST LOW 20 MIN: CPT | Performed by: PHYSICIAN ASSISTANT

## 2024-04-10 PROCEDURE — 99203 OFFICE O/P NEW LOW 30 MIN: CPT | Performed by: PHYSICIAN ASSISTANT

## 2024-04-10 ASSESSMENT — SLEEP AND FATIGUE QUESTIONNAIRES
SATISFACTION_WITH_CURRENT_SLEEP_PATTERN: SATISFIED
HOW LIKELY ARE YOU TO NOD OFF OR FALL ASLEEP WHILE LYING DOWN TO REST IN THE AFTERNOON WHEN CIRCUMSTANCES PERMIT: MODERATE CHANCE OF DOZING
HOW LIKELY ARE YOU TO NOD OFF OR FALL ASLEEP WHILE WATCHING TV: SLIGHT CHANCE OF DOZING
SLEEP_PROBLEM_INTERFERES_DAILY_ACTIVITIES: VERY MUCH NOTICEABLE
HOW LIKELY ARE YOU TO NOD OFF OR FALL ASLEEP WHILE SITTING AND TALKING TO SOMEONE: WOULD NEVER DOZE
HOW LIKELY ARE YOU TO NOD OFF OR FALL ASLEEP WHEN YOU ARE A PASSENGER IN A CAR FOR AN HOUR WITHOUT A BREAK: MODERATE CHANCE OF DOZING
HOW LIKELY ARE YOU TO NOD OFF OR FALL ASLEEP WHILE SITTING AND READING: SLIGHT CHANCE OF DOZING
DIFFICULTY_FALLING_ASLEEP: MILD
DIFFICULTY_STAYING_ASLEEP: MILD
ESS-CHAD TOTAL SCORE: 8
SITING INACTIVE IN A PUBLIC PLACE LIKE A CLASS ROOM OR A MOVIE THEATER: MODERATE CHANCE OF DOZING
HOW LIKELY ARE YOU TO NOD OFF OR FALL ASLEEP IN A CAR, WHILE STOPPED FOR A FEW MINUTES IN TRAFFIC: WOULD NEVER DOZE
HOW LIKELY ARE YOU TO NOD OFF OR FALL ASLEEP WHILE SITTING QUIETLY AFTER LUNCH WITHOUT ALCOHOL: WOULD NEVER DOZE
SLEEP_PROBLEM_NOTICEABLE_TO_OTHERS: SOMEWHAT
WORRIED_DISTRESSED_DUE_TO_SLEEP: SOMEWHAT

## 2024-04-10 ASSESSMENT — LIFESTYLE VARIABLES
HOW OFTEN DO YOU HAVE SIX OR MORE DRINKS ON ONE OCCASION: NEVER
SKIP TO QUESTIONS 9-10: 1
HOW MANY STANDARD DRINKS CONTAINING ALCOHOL DO YOU HAVE ON A TYPICAL DAY: 1 OR 2
HOW OFTEN DO YOU HAVE A DRINK CONTAINING ALCOHOL: 2-3 TIMES A WEEK
AUDIT-C TOTAL SCORE: 3

## 2024-04-10 ASSESSMENT — PATIENT HEALTH QUESTIONNAIRE - PHQ9
10. IF YOU CHECKED OFF ANY PROBLEMS, HOW DIFFICULT HAVE THESE PROBLEMS MADE IT FOR YOU TO DO YOUR WORK, TAKE CARE OF THINGS AT HOME, OR GET ALONG WITH OTHER PEOPLE: NOT DIFFICULT AT ALL
2. FEELING DOWN, DEPRESSED OR HOPELESS: SEVERAL DAYS
SUM OF ALL RESPONSES TO PHQ9 QUESTIONS 1 & 2: 2
1. LITTLE INTEREST OR PLEASURE IN DOING THINGS: SEVERAL DAYS

## 2024-04-10 ASSESSMENT — PAIN SCALES - GENERAL: PAINLEVEL: 0-NO PAIN

## 2024-04-10 NOTE — PATIENT INSTRUCTIONS
Thank you for coming to the Sleep Medicine Clinic today! Your sleep medicine provider today was: Cleveland Sheffield PA-C Below is a summary of your treatment plan, other important information, and our contact numbers:      TREATMENT PLAN     Call [phone]), option 3 to schedule your sleep study. When you have an appointment please call us back at [phone] to schedule a followup appointment 3-4 weeks after to review results.    Try taking lexapro at bedtime rather than in the morning.    Obstructive Sleep Apnea (VANNESA) is a sleep disorder where your upper airway muscles relax during sleep and the airway intermittently and repetitively narrows and collapses leading to partially blocked airway (hypopnea) or completely blocked airway (apnea) which, in turn, can disrupt breathing in sleep, lower oxygen levels while you sleep and cause night time wakings. Because both apnea and hypopnea may cause higher carbon dioxide or low oxygen levels, untreated VANNESA can lead to heart arrhythmia, elevation of blood pressure, and make it harder for the body to consolidate memory and facilitate metabolism (leading to higher blood sugars at night). Frequent partial arousals occur during sleep resulting in sleep deprivation and daytime sleepiness. VANNESA is associated with an increased risk of cardiovascular disease, stroke, hypertension, and insulin resistance. Moreover, untreated VANNESA with excessive daytime sleepiness can increase the risk of motor vehicular accidents.    Some conservative strategies for VANNESA regardless of VANNESA severity are:   Positional therapy - Avoid sleeping on your back.   Healthy diet and regular exercise to optimize weight is highly encouraged.   Avoid alcohol late in the evening and sedative-hypnotics as these substances can make sleep apnea worse.   Improve breathing through the nose with intranasal steroid spray, saline rinse, or antihistamines    Safety: Avoid driving vehicle and operating heavy  equipment while sleepy. Drowsy driving may lead to life-threatening motor vehicle accidents. A person driving while sleepy is 5 times more likely to have an accident. If you feel sleepy, pull over and take a short power nap (sleep for less than 30 minutes). Otherwise, ask somebody to drive you.    Treatment options for sleep apnea include weight management, positional therapy, Positive Airway Therapy (PAP) therapy, oral appliance therapy, hypoglossal nerve stimulator (Inspire) and select airway surgeries.      OUR SLEEP TESTING LOCATIONS     Our team will contact you to schedule your sleep study, however, you can contact us as follow:  Main Phone Line (scheduling only): [phone]), option 3  Adult and Pediatric Locations  [facility] (6 years and older): [facility] - 4th floor ([address] [facility]) After hours line: [phone]  [facility] (Main campus: All ages): [facility], 6th floor. After hours line: [phone]   [place] (18 years and older): [address] [facility], 2nd floor   [place] (18 years and older): [address] [facility]; 4th floor  After hours line: [phone]  [facility] (18 years and older) at [place]: [address] [facility]  After hours line: [phone]    [place] (5 years and older; younger considered on case-by-case basis): [address] [facility]; [address], [address]. Scheduling  After hours line: [phone]   [place] (6 years and older): [address]; [address]; [address]   [place] (6 years and older): [address] [facility], [address]  After hours line: [phone]   [place] (13 years and older) in [place]: [address], 2nd floor  After hours line: [phone]   [place] (13 year and older): [address]  After hours line: [phone]      IMPORTANT PHONE NUMBERS     Sleep Medicine Clinic Fax: [phone]  Appointments (for Adult Sleep Clinic):  "[phone] - option 2  Appointments (For Sleep Studies): [phone] - option 3  Behavioral Sleep Medicine: [phone]    [employer]): [phone]  For clinical questions and refilling prescriptions: [phone]  Brynn Carmichael (For Angelika/Nettie): P: [phone]  F: [phone]       CONTACTING YOUR SLEEP MEDICINE PROVIDER     Send a message directly to your provider through \"My Chart\", which is the email service through your  Records Account: [email or web address]   Call [phone] and leave a message. One of the administrative assistants will forward the message to your sleep medicine provider through \"My Chart\" and/or email.     Your sleep medicine provider for this visit was: Cleveland Sheffield PA-C  " negative

## 2024-04-15 ENCOUNTER — OFFICE VISIT (OUTPATIENT)
Dept: PRIMARY CARE | Facility: CLINIC | Age: 29
End: 2024-04-15
Payer: COMMERCIAL

## 2024-04-15 ENCOUNTER — OFFICE VISIT (OUTPATIENT)
Dept: PSYCHOLOGY | Facility: HOSPITAL | Age: 29
End: 2024-04-15
Payer: COMMERCIAL

## 2024-04-15 VITALS
HEIGHT: 67 IN | SYSTOLIC BLOOD PRESSURE: 115 MMHG | DIASTOLIC BLOOD PRESSURE: 75 MMHG | WEIGHT: 172 LBS | TEMPERATURE: 98 F | BODY MASS INDEX: 27 KG/M2 | OXYGEN SATURATION: 96 % | HEART RATE: 72 BPM

## 2024-04-15 DIAGNOSIS — R41.9 COGNITIVE COMPLAINTS: ICD-10-CM

## 2024-04-15 DIAGNOSIS — R41.3 MEMORY DIFFICULTY: Primary | ICD-10-CM

## 2024-04-15 DIAGNOSIS — Z02.89 PHYSICAL EXAM FOR CAMP: Primary | ICD-10-CM

## 2024-04-15 PROCEDURE — 3008F BODY MASS INDEX DOCD: CPT | Performed by: CLINICAL NEUROPSYCHOLOGIST

## 2024-04-15 PROCEDURE — 3008F BODY MASS INDEX DOCD: CPT | Performed by: FAMILY MEDICINE

## 2024-04-15 PROCEDURE — 99211NT NEUROPYSCH TESTING PENDING FINAL BILLING: Performed by: CLINICAL NEUROPSYCHOLOGIST

## 2024-04-15 PROCEDURE — 99213 OFFICE O/P EST LOW 20 MIN: CPT | Performed by: FAMILY MEDICINE

## 2024-04-15 ASSESSMENT — ANXIETY QUESTIONNAIRES
4. TROUBLE RELAXING: SEVERAL DAYS
5. BEING SO RESTLESS THAT IT IS HARD TO SIT STILL: SEVERAL DAYS
2. NOT BEING ABLE TO STOP OR CONTROL WORRYING: NEARLY EVERY DAY
6. BECOMING EASILY ANNOYED OR IRRITABLE: NOT AT ALL
3. WORRYING TOO MUCH ABOUT DIFFERENT THINGS: NEARLY EVERY DAY
GAD7 TOTAL SCORE: 12
IF YOU CHECKED OFF ANY PROBLEMS ON THIS QUESTIONNAIRE, HOW DIFFICULT HAVE THESE PROBLEMS MADE IT FOR YOU TO DO YOUR WORK, TAKE CARE OF THINGS AT HOME, OR GET ALONG WITH OTHER PEOPLE: SOMEWHAT DIFFICULT
1. FEELING NERVOUS, ANXIOUS, OR ON EDGE: NEARLY EVERY DAY
7. FEELING AFRAID AS IF SOMETHING AWFUL MIGHT HAPPEN: SEVERAL DAYS

## 2024-04-15 ASSESSMENT — PATIENT HEALTH QUESTIONNAIRE - PHQ9
1. LITTLE INTEREST OR PLEASURE IN DOING THINGS: NOT AT ALL
2. FEELING DOWN, DEPRESSED OR HOPELESS: NOT AT ALL
SUM OF ALL RESPONSES TO PHQ9 QUESTIONS 1 AND 2: 0

## 2024-04-15 ASSESSMENT — ENCOUNTER SYMPTOMS
LOSS OF SENSATION IN FEET: 0
DEPRESSION: 0
OCCASIONAL FEELINGS OF UNSTEADINESS: 0

## 2024-04-15 NOTE — PROGRESS NOTES
Mr. Osborne was seen for an initial visit as part of a multi-visit neuropsychological evaluation and is scheduled to return to clinic to complete the evaluation on 05/09/2024. Please see note on final visit for complete history, results, impressions, and recommendations.    Thank you for the opportunity to participate in Mr. Osborne' evaluation and care.  If I can provide any additional assistance, please do not hesitate to contact me at [phone].    Sincerely,    Shabnam Butcher PsyD  Postdoctoral Fellow in Clinical Neuropsychology      Xavi Patterson, PhD  Clinical Neuropsychologist  Former Director of Clinical Neuropsychology, [facility]  Professor of Neurology, [employer]  Fellow of the National Academy of Neuropsychology  Fellow of the American Psychological Association  Fellow of the Sports Neuropsychology Society  Fellow of the American Epilepsy Society

## 2024-04-16 NOTE — PROGRESS NOTES
"Subjective   Patient ID: Yadiel Osborne is a 29 y.o. male who presents for physical form .  Patient needs form completed for mission trip to [facility].  Will be going for 1-2 weeks.  Reviewed medications, history.  Immunizations should be up to date.  No concerns today.    Review of Systems    Objective   /75   Pulse 72   Temp 36.7 °C (98 °F)   Ht 1.702 m (5' 7\")   Wt 78 kg (172 lb)   SpO2 96%   BMI 26.94 kg/m²     Physical Exam  Vitals reviewed.   Constitutional:       General: He is not in acute distress.     Appearance: Normal appearance.   HENT:      Head: Normocephalic and atraumatic.      Right Ear: Tympanic membrane and ear canal normal.      Left Ear: Tympanic membrane and ear canal normal.      Nose: Nose normal.      Mouth/Throat:      Mouth: Mucous membranes are moist.      Pharynx: Oropharynx is clear.   Eyes:      Pupils: Pupils are equal, round, and reactive to light.   Cardiovascular:      Rate and Rhythm: Normal rate and regular rhythm.      Heart sounds: Normal heart sounds. No murmur heard.  Pulmonary:      Effort: Pulmonary effort is normal.      Breath sounds: Normal breath sounds.   Abdominal:      General: Bowel sounds are normal.      Palpations: Abdomen is soft. There is no mass.      Tenderness: There is no abdominal tenderness.   Musculoskeletal:         General: Normal range of motion.      Cervical back: No rigidity.      Right lower leg: No edema.      Left lower leg: No edema.   Lymphadenopathy:      Cervical: No cervical adenopathy.   Skin:     General: Skin is warm and dry.   Neurological:      Mental Status: He is alert. Mental status is at baseline.      Gait: Gait normal.   Psychiatric:         Mood and Affect: Mood normal.         Behavior: Behavior normal.         Thought Content: Thought content normal.         Judgment: Judgment normal.       Assessment/Plan   Problem List Items Addressed This Visit    None         "

## 2024-04-16 NOTE — PATIENT INSTRUCTIONS
Form completed for mission trip to [facility].  TDaP done in 2019.  Copy of immunization record on chart given.  May need to get other immunization records from parent.  If unavailable, blood work can be done to prove immunity.    TB test (PPD) may also need to be done.    No contraindications to participating in trip.

## 2024-04-24 DIAGNOSIS — J45.20 MILD INTERMITTENT COLD-INDUCED ASTHMA WITHOUT COMPLICATION (HHS-HCC): ICD-10-CM

## 2024-04-30 RX ORDER — ALBUTEROL SULFATE 90 UG/1
2 AEROSOL, METERED RESPIRATORY (INHALATION) EVERY 4 HOURS PRN
Qty: 8 G | Refills: 1 | Status: SHIPPED | OUTPATIENT
Start: 2024-04-30 | End: 2025-04-30

## 2024-05-08 DIAGNOSIS — F32.0 DEPRESSION, MAJOR, SINGLE EPISODE, MILD (CMS-HCC): ICD-10-CM

## 2024-05-08 DIAGNOSIS — F41.1 GENERALIZED ANXIETY DISORDER: ICD-10-CM

## 2024-05-08 RX ORDER — ESCITALOPRAM OXALATE 20 MG/1
20 TABLET ORAL DAILY
Qty: 90 TABLET | Refills: 0 | Status: SHIPPED | OUTPATIENT
Start: 2024-05-08 | End: 2024-08-06

## 2024-05-09 ENCOUNTER — APPOINTMENT (OUTPATIENT)
Dept: PSYCHOLOGY | Facility: HOSPITAL | Age: 29
End: 2024-05-09
Payer: COMMERCIAL

## 2024-05-09 DIAGNOSIS — F32.0 DEPRESSION, MAJOR, SINGLE EPISODE, MILD (CMS-HCC): ICD-10-CM

## 2024-05-09 DIAGNOSIS — R41.3 MEMORY DIFFICULTY: Primary | ICD-10-CM

## 2024-05-09 DIAGNOSIS — G24.1: ICD-10-CM

## 2024-05-09 DIAGNOSIS — F41.1 GENERALIZED ANXIETY DISORDER: ICD-10-CM

## 2024-05-09 PROCEDURE — 96133 NRPSYC TST EVAL PHYS/QHP EA: CPT | Performed by: CLINICAL NEUROPSYCHOLOGIST

## 2024-05-09 PROCEDURE — 96138 PSYCL/NRPSYC TECH 1ST: CPT | Performed by: CLINICAL NEUROPSYCHOLOGIST

## 2024-05-09 PROCEDURE — 96116 NUBHVL XM PHYS/QHP 1ST HR: CPT | Mod: AH,GC | Performed by: CLINICAL NEUROPSYCHOLOGIST

## 2024-05-09 PROCEDURE — 96132 NRPSYC TST EVAL PHYS/QHP 1ST: CPT | Performed by: CLINICAL NEUROPSYCHOLOGIST

## 2024-05-09 PROCEDURE — 96139 PSYCL/NRPSYC TST TECH EA: CPT | Performed by: CLINICAL NEUROPSYCHOLOGIST

## 2024-05-09 PROCEDURE — 96132 NRPSYC TST EVAL PHYS/QHP 1ST: CPT | Mod: AH,GC | Performed by: CLINICAL NEUROPSYCHOLOGIST

## 2024-05-09 PROCEDURE — 96133 NRPSYC TST EVAL PHYS/QHP EA: CPT | Mod: AH,GC | Performed by: CLINICAL NEUROPSYCHOLOGIST

## 2024-05-09 PROCEDURE — 96138 PSYCL/NRPSYC TECH 1ST: CPT | Mod: AH,GC | Performed by: CLINICAL NEUROPSYCHOLOGIST

## 2024-05-09 PROCEDURE — 96139 PSYCL/NRPSYC TST TECH EA: CPT | Mod: AH,GC | Performed by: CLINICAL NEUROPSYCHOLOGIST

## 2024-05-09 PROCEDURE — 96116 NUBHVL XM PHYS/QHP 1ST HR: CPT | Performed by: CLINICAL NEUROPSYCHOLOGIST

## 2024-06-12 ENCOUNTER — OFFICE VISIT (OUTPATIENT)
Dept: PSYCHOLOGY | Facility: CLINIC | Age: 29
End: 2024-06-12
Payer: COMMERCIAL

## 2024-06-12 DIAGNOSIS — R53.83 FATIGUE, UNSPECIFIED TYPE: ICD-10-CM

## 2024-06-12 DIAGNOSIS — R41.9 COGNITIVE COMPLAINTS: Primary | ICD-10-CM

## 2024-06-12 DIAGNOSIS — F41.1 GENERALIZED ANXIETY DISORDER: ICD-10-CM

## 2024-06-12 DIAGNOSIS — G24.3 CERVICAL DYSTONIA: ICD-10-CM

## 2024-06-12 DIAGNOSIS — F32.0 DEPRESSION, MAJOR, SINGLE EPISODE, MILD (CMS-HCC): ICD-10-CM

## 2024-06-12 PROCEDURE — 96121 NUBHVL XM PHY/QHP EA ADDL HR: CPT | Mod: AH

## 2024-06-12 PROCEDURE — 96116 NUBHVL XM PHYS/QHP 1ST HR: CPT | Mod: AH

## 2024-06-12 PROCEDURE — 3008F BODY MASS INDEX DOCD: CPT

## 2024-06-12 PROCEDURE — 96121 NUBHVL XM PHY/QHP EA ADDL HR: CPT

## 2024-06-12 PROCEDURE — 96116 NUBHVL XM PHYS/QHP 1ST HR: CPT

## 2024-06-12 NOTE — PROGRESS NOTES
"NEUROPSYCHOLOGICAL REHABILITATION INTAKE    Name: Yadiel Osborne   MRN: [id number]   Age: 29 y.o.   Referring Provider: Xavi Patterson, PhD (Neuropsychologist)   Date of Service: 6/12/2024     Reason For Referral  Mr. Yadiel Osborne is a 29 y.o. male who was referred by Dr. Patterson for neuropsychological rehabilitation due to cognitive and emotional complaints.     History of Presenting Illness   [facility] notes indicate Mr. Osborne completed a neuropsychological evaluation on 05/09/2024 with Dr. Patterson due to his history of DYT1 (s/p bilateral GPi DBS in 2008) and complaints of memory loss, word finding difficulty, and brain fog. \"Objective cognitive testing yielded a profile characterized by relative weaknesses on measures of fine motor speed (consistent with history of dystonia) and aspects of executive functioning (particularly on timed measures)....Mr. Osborne scored in the normal range on measures of attention/concentration, processing speed, and visual-spatial perception and construction.\" The provider indicated the cognitive profile was consistent with someone who has cognitive complaints secondary to depression, stress, anxiety, unrestful sleep, and fatigue. As such, he was referred for neuropsychological rehabilitation to develop compensatory strategies.     Cognitive Complaints and Functional Status   Onset/Course: Mr. Osborne reported cognitive complaints started when he was in high school, and cognition has progressively worsened since highschool with a more notable change in 2020.    Current Cognitive Complaints:   Brain fog: \"tension in the brain\" with the brain not being at rest. He also has a hard time being present in the moment.   Attention: He reported problems sustaining attention and being easily distracted.   Language: Problems with word finding and pronunciation of words, and this is worse when he is trying to communicate more quickly.   Speed of processing: He indicated fatigue can sometimes impact speed " of processing.   Memory: He endorsed short-term memory complaints, such as forgetting the price of items he just read, conversations, and events that recently occurred. He denied marcos long-term memory complaints, although he indicated he may be trying to repress the past 20 years.     Executive dysfunction: He will often second guess himself, and this makes it difficult to make decisions.  Metacognition: He appears to be attuned (possibly oversensitive) to cognitive limitations.   Current Functional Status:  Mr. Osborne denied cognitive problems related to managing activities of daily living (e.g., washing, bathing, and grooming) and instrumental activities of daily living (e.g., managing medications, finances, medical appointments, meal preparation, and transportation).     Relevant Medical History and Status  Past Medical: Medical history includes DYT1 dystonia with onset of symptom around age 10 (s/p DBS in 01/2008), and this is followed by Dr. Posadas and MARK Hoffmann in the Department of Neurology. There is no known history of traumatic brain injuries, seizures, or other neurological disorders. Family medical history is notable for dystonia (maternal grandmother; potentially maternal great grandmother) and cerebrovascular disorder (paternal grandfather, stroke).   Current Physical Complaints  Dystonia: He indicated having dystonia since childhood which limits his ability to complete certain activities (e.g., bungee jumping and sports).  Balance: He reported minor problems with balance.  Dizziness: He indicated sometimes getting lightheaded when standing up quickly.  Fatigue: He indicated significant fatigue that he described to include low energy and always feeling tired. He also reported a heaviness behind his eyes.  Sensory changes: He indicated occasional floaters in his eyes, as well as occasional ringing in his ear and problems smelling.   Sleep: He reportedly sleeps 9 hours nightly, and he denied  "difficulties with sleep initiation or maintenance. However, he still does not feel rested after sleeping. He indicated his plan is to complete a sleep study.  Substance Use History:   Alcohol: He reported socially drinking on rare occasions, and he denied having a history of alcohol abuse.   Illicit drugs: He denied current and past abuse of recreational drugs.   Tobacco: He is a lifetime non-smoker.   Caffeine: He reported consuming one cold brew per day.    Relevant Psychiatric History and Status  Past Psychiatric: Mr. Osborne reported a longstanding history of depression and anxious tendencies. He has trailed several psychotropic medications (Effexor, Celexa, Lexapro), and he is currently prescribed Lexapro which is managed by Dr. Kiran in Internal Medicine. Medication is reportedly not helpful. Mr. Osborne has also been engaged in weekly counseling for over 2.5 years without significant benefit, and he indicated having three therapists throughout that time. Currently, he is seeing  Patrick Collier LCSW. He described sessions as supportive in nature, and he completed EMDR (not helpful).   Current Psychological Complaints:   Current stress: Multiple stressors since 2020, such as losing a loved one, relationship issues, family stress, career uncertainty, and finances. He described his life as feeling \"stagnant\" with him wanting to find a purpose in life.   Depression: He endorsed some sadness and anhedonia, and he has a tendency to have a pessimistic view of the world/life and catastrophize situations/thoughts.   Anxiety: He indicated having significant worry about everything which is hard to control. He described it as \"ball tension.\" There is significant anxiety about current career prospects and relationships.  Irritability/anger: He is also feeling more agitated at work and himself.  Interpersonal interactions: He feels like he puts more effort into his relationships, and this may be causing fatigue in his " relationships.   Confidence: It can be variable, and he indicated low levels of confidence in the past due to health concerns.   Psychosis: He denied having hallucinations, delusions, and paranoia.  Identity: Introspective, minimalist, social, cautious, environmentalist, and open-book.   SI/HI: Mr. Osborne had a history of suicidal ideation last year, but he denied any current ideations/thoughts/intent. He indicated if he had these thoughts again he would talk to his therapist, and he was also instructed to call 911 or go to ED; he verbalized his understanding. Protective factors are his family, past accomplishments, and ancestors.     Personal History   Born and Raised: He was raised by his biological mother (Zoila) and father (Chance). He is the youngest and has three siblings (Blaine, Anupam, and Francisco Javier). He described his childhood to be filled with turmoil due to his dystonia/health concerns. He denied abuse in the home.  Education: He completed a Bachelor's degree in historical preservation. He reported always needed a  and frequently met with teachers to keep up with peers during high school. He had relative strengths in English, science, and history; he has a general weakness in math. He repeated 7th grade due to DBS surgery and complications. Concerns with inattention in high school were noted, prompting a trial of stimulants (Vyvanse, Adderall) which was prescribed by his PCP. This reportedly increased anxiety, and the medication was discontinues.   Work History: He is working full-time as a  at  [employer]. He is interested in possibly changing careers, but there has been hesitancy because of fatigue and negative self-judgement.   Legal History: He denied current legal concerns.   Social History: He is single and lives alone. His primary social supports are his friends (Abby, Jose Juan, and Greer) and family (Zoila/Chance).  Leisure Activities: He enjoys working on his truck, walking, and  "collecting antiques. These activities have decreased with less interest in material goods.    Behavioral Observations   He arrived on time and unaccompanied.   General Appearance: Well groomed, appropriate eye contact  Attitude/Behavior: Cooperative  Motor: No psychomotor agitation or retardation, no tremor or other abnormal movements  Speech: Normal rate, volume, prosody  Gait/Station: WFL - Within functional limits  Affect: Euthymic, full-range, Anxious  Thought Process: Linear, goal directed  Thought Associations: No loosening of associations  Thought Content: Normal  Perception: No perceptual abnormalities noted  Sensorium: Alert  Insight: Intact  Judgement: Intact     Diagnosis  1. Cognitive complaints    2. Cervical dystonia    3. Fatigue, unspecified type    4. Depression, major, single episode, mild (CMS-[facility])    5. Generalized anxiety disorder       Summary  Mr. Osborne is a 29 y.o. male who was referred for neuropsychological rehabilitation due to cognitive and emotional complaints. Primary medical history dystonia, depression, and anxiety. The patient is functionally independent. His primary concerns include problems with short-term memory, claudia fog, attention, word finding, depression (feeling \"stagnant), and anxiety.        Plan   Initiate neuropsychological rehabilitation on a weekly basis. The next session is scheduled on 6/19 at 900, and the home practice is to identify what would cause him to feel \"relief\" and write out behavioral goals. The plan is to complete a check-in, review home practice, create goals, and discuss one goal. He completed a VITOR to talk to his therapist.   He will follow-up with the sleep study.     Time  [id number] (record review, clinical interview, and report writing)   "

## 2024-06-19 ENCOUNTER — OFFICE VISIT (OUTPATIENT)
Dept: PSYCHOLOGY | Facility: CLINIC | Age: 29
End: 2024-06-19
Payer: COMMERCIAL

## 2024-06-19 DIAGNOSIS — F41.1 GENERALIZED ANXIETY DISORDER: ICD-10-CM

## 2024-06-19 DIAGNOSIS — G24.3 CERVICAL DYSTONIA: ICD-10-CM

## 2024-06-19 DIAGNOSIS — R53.83 FATIGUE, UNSPECIFIED TYPE: ICD-10-CM

## 2024-06-19 DIAGNOSIS — F32.0 DEPRESSION, MAJOR, SINGLE EPISODE, MILD (CMS-HCC): Primary | ICD-10-CM

## 2024-06-19 DIAGNOSIS — R41.9 COGNITIVE COMPLAINTS: ICD-10-CM

## 2024-06-19 PROCEDURE — 3008F BODY MASS INDEX DOCD: CPT

## 2024-06-19 PROCEDURE — 90837 PSYTX W PT 60 MINUTES: CPT

## 2024-06-19 PROCEDURE — 90837 PSYTX W PT 60 MINUTES: CPT | Mod: AH

## 2024-06-19 NOTE — PROGRESS NOTES
NEUROPSYCHOLOGICAL REHABILITATION NOTE    Name: Yadiel Osborne   MRN: [id number]   Age: 29 y.o.   Date of Service: 6/19/2024  Reason For Visit: Neuropsychological Rehabilitation     Reason For Referral  Mr. Yadiel Osborne is a 29 y.o. male who was referred by Dr. Patterson for neuropsychological rehabilitation due to cognitive and emotional complaints.     Goals:   1) Develop strategies to manage depression/anxiety   2) Develop adaptive responses to negative self talk    Session Details:   The agenda for the session involved completing a check-in, reviewing his neuropsychological rehabilitation formulation, reviewing home practice, creating goals, and introducing ACT. During the check-in, the patient indicated he has been feeling more down. The formulation was reviewed; he indicated it was a relatively accurate representation. Home practice was completed to write about what relief would look like for him and to think about goals. He described relief to look like having mental clarity, getting an understanding about his depression/anxiety, developing strategies to manage mood, and gaining confidence in himself. Treatment goals were collaboratively created (above). The remainder of the session focused on introducing ACT, and we discussed the core principles of ACT and the choice point.     Diagnosis:   1. Depression, major, single episode, mild (CMS-HCC)    2. Cognitive complaints    3. Cervical dystonia    4. Fatigue, unspecified type    5. Generalized anxiety disorder       Plan:   The next in-person session will be on 6/26 at 1100. For home practice, he will read two chapters of the Happiness trap and schedule sleep study. The plan for next session will be to complete a check-in, review home practice, and discuss creative hopelessness.     Time:   [id number]

## 2024-06-26 ENCOUNTER — OFFICE VISIT (OUTPATIENT)
Dept: PSYCHOLOGY | Facility: CLINIC | Age: 29
End: 2024-06-26
Payer: COMMERCIAL

## 2024-06-26 DIAGNOSIS — G24.3 CERVICAL DYSTONIA: ICD-10-CM

## 2024-06-26 DIAGNOSIS — R53.83 FATIGUE, UNSPECIFIED TYPE: ICD-10-CM

## 2024-06-26 DIAGNOSIS — F32.0 DEPRESSION, MAJOR, SINGLE EPISODE, MILD (CMS-HCC): Primary | ICD-10-CM

## 2024-06-26 DIAGNOSIS — R41.9 COGNITIVE COMPLAINTS: ICD-10-CM

## 2024-06-26 DIAGNOSIS — F41.1 GENERALIZED ANXIETY DISORDER: ICD-10-CM

## 2024-06-26 PROCEDURE — 3008F BODY MASS INDEX DOCD: CPT

## 2024-06-26 PROCEDURE — 90837 PSYTX W PT 60 MINUTES: CPT

## 2024-06-26 PROCEDURE — 90837 PSYTX W PT 60 MINUTES: CPT | Mod: AH

## 2024-06-26 NOTE — PROGRESS NOTES
NEUROPSYCHOLOGICAL REHABILITATION NOTE    Name: Yadiel Osborne   MRN: [id number]   Age: 29 y.o.   Date of Service: 6/26/2024  Reason For Visit: Neuropsychological Rehabilitation     Reason For Referral  Mr. Yadiel Osborne is a 29 y.o. male who was referred by Dr. Patterson for neuropsychological rehabilitation due to depression/anxiety, as well as history of cervical dystonia.     Goals:   1) Develop strategies to manage depression/anxiety   2) Develop adaptive responses to negative self talk    Session Details:   He arrived on time and unaccompanied.     The agenda for the session involved completing a check-in, discussing creative hopelessness, and continuing to discuss ACT concepts. During the check-in, the patient indicated he reported having more negative thoughts/feelings that are difficult for him to manage.  The home practice was completed to read 2 chapters of the Happiness Trap, but he did not schedule his sleep study. He acknowledged trying to avoid scheduling the sleep study, and we processed it together. The remainder of the session focused on discussing ACT concepts that focused primarily on creative hopelessness. We completed the pushing away paper exercise, where he struggled to identify things that bring meaning/purpose to his life. Nonetheless, he was able to acknowledge the futile nature of trying to control unhelpful thoughts and feelings which he has always tried in the past. We also discussed the concept of workability, where the focus is on if the thought/feeling is helpful/unhelpful versus true/false.      Diagnosis:   1. Depression, major, single episode, mild (CMS-HCC)    2. Cognitive complaints    3. Cervical dystonia    4. Fatigue, unspecified type    5. Generalized anxiety disorder      Plan:   The next in-person session will be on 7/3 at 4PM. For home practice, he will read chapters 3-6 of the Happiness Trap, schedule sleep study, and notice when he is struggling with a focusing on  workability. The plan for next session will be to complete a check-in, review home practice, and introduce dropping the anchor and cognitive defusion.     Time:   2594-3333

## 2024-06-29 DIAGNOSIS — F32.0 DEPRESSION, MAJOR, SINGLE EPISODE, MILD (CMS-HCC): ICD-10-CM

## 2024-06-29 DIAGNOSIS — E05.90 HYPERTHYROIDISM: Primary | ICD-10-CM

## 2024-07-03 ENCOUNTER — LAB (OUTPATIENT)
Dept: LAB | Facility: LAB | Age: 29
End: 2024-07-03
Payer: COMMERCIAL

## 2024-07-03 ENCOUNTER — OFFICE VISIT (OUTPATIENT)
Dept: PSYCHOLOGY | Facility: CLINIC | Age: 29
End: 2024-07-03
Payer: COMMERCIAL

## 2024-07-03 DIAGNOSIS — F41.1 GENERALIZED ANXIETY DISORDER: ICD-10-CM

## 2024-07-03 DIAGNOSIS — F32.0 DEPRESSION, MAJOR, SINGLE EPISODE, MILD (CMS-HCC): ICD-10-CM

## 2024-07-03 DIAGNOSIS — G24.3 CERVICAL DYSTONIA: ICD-10-CM

## 2024-07-03 DIAGNOSIS — R41.9 COGNITIVE COMPLAINTS: ICD-10-CM

## 2024-07-03 DIAGNOSIS — F32.0 DEPRESSION, MAJOR, SINGLE EPISODE, MILD (CMS-HCC): Primary | ICD-10-CM

## 2024-07-03 DIAGNOSIS — E05.90 HYPERTHYROIDISM: ICD-10-CM

## 2024-07-03 DIAGNOSIS — R53.83 FATIGUE, UNSPECIFIED TYPE: ICD-10-CM

## 2024-07-03 LAB
ALBUMIN SERPL BCP-MCNC: 4.6 G/DL (ref 3.4–5)
ALP SERPL-CCNC: 60 U/L (ref 33–120)
ALT SERPL W P-5'-P-CCNC: 16 U/L (ref 10–52)
ANION GAP SERPL CALC-SCNC: 14 MMOL/L (ref 10–20)
AST SERPL W P-5'-P-CCNC: 17 U/L (ref 9–39)
BILIRUB SERPL-MCNC: 1 MG/DL (ref 0–1.2)
BUN SERPL-MCNC: 14 MG/DL (ref 6–23)
CALCIUM SERPL-MCNC: 9 MG/DL (ref 8.6–10.3)
CHLORIDE SERPL-SCNC: 100 MMOL/L (ref 98–107)
CO2 SERPL-SCNC: 29 MMOL/L (ref 21–32)
CREAT SERPL-MCNC: 0.9 MG/DL (ref 0.5–1.3)
EGFRCR SERPLBLD CKD-EPI 2021: >90 ML/MIN/1.73M*2
GLUCOSE SERPL-MCNC: 64 MG/DL (ref 74–99)
POTASSIUM SERPL-SCNC: 3.7 MMOL/L (ref 3.5–5.3)
PROT SERPL-MCNC: 6.9 G/DL (ref 6.4–8.2)
SODIUM SERPL-SCNC: 139 MMOL/L (ref 136–145)
TSH SERPL-ACNC: 2.06 MIU/L (ref 0.44–3.98)

## 2024-07-03 PROCEDURE — 90837 PSYTX W PT 60 MINUTES: CPT

## 2024-07-03 PROCEDURE — 36415 COLL VENOUS BLD VENIPUNCTURE: CPT

## 2024-07-03 PROCEDURE — 84443 ASSAY THYROID STIM HORMONE: CPT

## 2024-07-03 PROCEDURE — 90837 PSYTX W PT 60 MINUTES: CPT | Mod: AH

## 2024-07-03 PROCEDURE — 3008F BODY MASS INDEX DOCD: CPT

## 2024-07-03 PROCEDURE — 80053 COMPREHEN METABOLIC PANEL: CPT

## 2024-07-03 NOTE — PROGRESS NOTES
NEUROPSYCHOLOGICAL REHABILITATION NOTE    Name: Yadiel Osborne   MRN: [id number]   Age: 29 y.o.   Date of Service: 7/3/2024  Reason For Visit: Neuropsychological Rehabilitation     Reason For Referral  Mr. Yadiel Osborne is a 29 y.o. male who was referred by Dr. Patterson for neuropsychological rehabilitation due to depression/anxiety, as well as history of cervical dystonia.     Goals:   1) Develop strategies to manage depression/anxiety   2) Develop adaptive responses to negative self talk    Session Details:   He arrived on time and unaccompanied.     The agenda for the session involved completing a check-in, reviewing home practice, and introducing dropping anchor. During the check-in, the patient indicated feeling more irritable/annoyed at work. A choice point was completed; he was interested in practicing being authentic and humble at work to support towards behavior. The home practice was reviewed to read chapters 3-5 of the Happiness Trap (completed), schedule sleep study (not scheduled), and notice when he is struggling (completed). The remainder of the session focused on discussing defusion, as well as introducing him to the technique of dropping anchor. He continues to express anxiety about his health, where he indicated being concerned he may have undiagnosed ADHD. We discussed his interest in completing an ADHD evaluation (deferred), although he continues to express his interest in not taking stimulant medication.     Diagnosis:   1. Depression, major, single episode, mild (CMS-HCC)    2. Cognitive complaints    3. Cervical dystonia    4. Fatigue, unspecified type    5. Generalized anxiety disorder      Plan:   The next in-person session will be on 7/10 at 4PM. For home practice, he will read chapters 6-9 of the Happiness Trap, practice being humble/authentic at work, practice dropping anchor, and complete value exercise (bonus). The plan for next session will be to complete a check-in, review home practice,  discuss cognitive defusion strategies, and explore values.     Time:   1443-1432

## 2024-07-10 ENCOUNTER — OFFICE VISIT (OUTPATIENT)
Dept: PSYCHOLOGY | Facility: CLINIC | Age: 29
End: 2024-07-10
Payer: COMMERCIAL

## 2024-07-10 DIAGNOSIS — R53.83 FATIGUE, UNSPECIFIED TYPE: ICD-10-CM

## 2024-07-10 DIAGNOSIS — F32.0 DEPRESSION, MAJOR, SINGLE EPISODE, MILD (CMS-HCC): Primary | ICD-10-CM

## 2024-07-10 DIAGNOSIS — F41.1 GENERALIZED ANXIETY DISORDER: ICD-10-CM

## 2024-07-10 DIAGNOSIS — R41.9 COGNITIVE COMPLAINTS: ICD-10-CM

## 2024-07-10 DIAGNOSIS — G24.3 CERVICAL DYSTONIA: ICD-10-CM

## 2024-07-10 PROCEDURE — 90837 PSYTX W PT 60 MINUTES: CPT

## 2024-07-10 PROCEDURE — 3008F BODY MASS INDEX DOCD: CPT

## 2024-07-10 PROCEDURE — 90837 PSYTX W PT 60 MINUTES: CPT | Mod: AH

## 2024-07-10 NOTE — PROGRESS NOTES
NEUROPSYCHOLOGICAL REHABILITATION NOTE    Name: Yadiel Osborne   MRN: [id number]   Age: 29 y.o.   Date of Service: 7/10/2024  Reason For Visit: Neuropsychological Rehabilitation     Reason For Referral  Mr. Yadiel Osborne is a 29 y.o. male who was referred by Dr. Patterson for neuropsychological rehabilitation due to depression/anxiety, as well as history of cervical dystonia.     Goals:   1) Develop strategies to manage depression/anxiety   2) Develop adaptive responses to negative self talk    Session Details:   He arrived on time and unaccompanied.     The agenda for the session involved completing a check-in, reviewing home practice, and discussing cognitive defusion. During the check-in, the patient indicated feeling more annoyed with work and having less time to complete tasks outside of work. We discussed trying to accept the things we can control, as well as let go of the things we can't control. The home practice was completed to read chapters 6-9 of the Happiness Trap, practice being humble/authentic at work, practice dropping anchor, and complete value exercise (bonus; not completed). The remainder of the session focused on discussing defusion from negative thoughts. We discussed his tendency to ruminate and fixate on negative thoughts, and how this is hooking/fusing behaviors. We discussed multiple strategies to defuse from negative thoughts like labeling the thought, naming the TV show, and repeating the thought numerous times.     Diagnosis:   1. Depression, major, single episode, mild (CMS-HCC)    2. Cognitive complaints    3. Cervical dystonia    4. Fatigue, unspecified type    5. Generalized anxiety disorder      Plan:   The next in-person session will be on 7/24 at 900AM. For home practice, he will read chapters 10-12 of the Happiness Trap, practice cognitive defusion techniques, and complete value exercise. The plan for next session will be to complete a check-in, review home practice, and explore  values.     Time:   1147-4837

## 2024-07-17 NOTE — PROGRESS NOTES
REASON FOR EVALUATION:    Memory Difficulty    CURRENT COMPLAINTS AND HISTORY:      Mr. Osborne is a 29-year-old right-handed male who was referred with a history of DYT1 dystonia (s/p bilateral GPi DBS in 2008) with complaints of memory difficulty. Records indicate the patient was seen by MARK Hoffmann on 02/08/2024 for a follow up visit and reported memory issues, word finding difficulty, and brain fog at that time. Mr. Osborne expressed concern regarding if cognitive changes were related to DBS surgery and/or DYT1 though depression, anxiety, and insomnia were noted as contributing factors.  Neuropsychological evaluation was requested to rule out memory decline (ICD R41.3) and other cognitive deficiencies and to assist in clarifying the diagnosis and treatment plan.         At the time of the current evaluation, Mr. Osborne longstanding cognitive complaints (e.g., brain fog, losing train of thought, fatigue) since high school (was trialed on stimulants at that time with adverse increase in anxiety/symptoms).  His cognitive difficulties have progressively worsened since then with more notable changes in the past few years. Specifically, he reported quickly forgetting information if he does not engage in effortful repetition/rehearsal, distractibility, and losing his train of thought (which was observed during the clinical interview), He also reported difficulty with finding the right word (finds an adequate word or thinks of the correct word later; worsens with fatigue) and pronunciation. He experiences brain fog (“mental blockade”) and fatigue despite getting adequate sleep, engaging in physical activity, and healthy eating. Notably, Mr. Osborne noted several significant stressors since 2020, including passing of loved ones, relationship issues, family stress, and career uncertainty with limited benefit from current treatments. Despite his cognitive concerns, Mr. Osborne is living independently (denied any concerns with  completing household chores, medication management, driving) and working full-time as a  at  [employer]. He expressed hesitancy to take on more work-related responsibilities or change careers primarily due to fatigue/stamina (i.e., not cognitive difficulties).      Medical/surgical history is notable for DYT1 dystonia with onset of symptom around age 10. 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 He established with Dr. Trimble for DBS generator replacement bilaterally in July 2013, November 2017 and November 2021. Currently, he is followed by Dr. Posadas and MARK Hoffmann in the Department of Neurology. During interview, Mr. Osborne noted dystonia was “generally” well managed though endorsed some days he still feels “off,” which can be exacerbated by anxiety. Abnormal TSH was noted to be monitored via monthly blood work; no medications were prescribed at that time of the current evaluation. Sleep is typically 9 hours/night (denied difficulties with sleep initiation or maintenance) but wakes feeling unrested and fatigues quickly throughout the day. Mr. Osborne is also followed by Sleep Medicine and reported intentions to undergo a sleep study during interview. He reported infrequent consumption of alcohol, has never smoked tobacco, and denied significant use of recreational drugs. He is single and lives alone; social support through work was noted. He endorsed several stressors throughout the course of his life; more recent stressors since 2020 were also reported.     Psychiatric and psychosocial history was reviewed; Mr. Osborne reported a longstanding history of depression and anxious tendencies. He noted being trialed on several psychotropic medications over the years (including Effexor, Celexa, Lexapro) and is currently prescribed Lexapro (20mg; managed by Dr. Kiran of  Internal Medicine), without benefit. Mr. Osborne has also been engaged in weekly counseling for the past 2.5 years without significant benefit. He described his current mood as “depressed,” characterized by a tendency to have a pessimistic view of the world/life and to catastrophize situations/thoughts. Existential concerns, including his purpose in life, were also noted throughout the interview. Mr. Osborne noted a history of suicidal ideation in the past year though denied any current thoughts/intent; protective factors noted during the interview included his friends and hopefulness about the future.     Family medical history is notable for (maternal grandmother) and cerebrovascular disorder (paternal grandfather, stroke). Otherwise, there is no known family history of mental health issues, memory loss, epilepsy/seizures, brain tumor, multiple sclerosis, or other neurological disorder.      With regard to educational and vocational history, Mr. Osborne reported onset and worsening of dystonia in early elementary school, prompting use of a wheelchair and writing aid in the 3rd grade. He repeated 7th grade due to DBS surgery and complications noted above. He completed high school but noted he always needed a  and frequently met with teachers to keep up with peers. English, science, and history came easily, whereas math required more effort. Concerns with inattention in high school were noted, prompting a trial of stimulants (Vyvanse, Adderall) prescribed by his primary care physician, which reportedly increased symptoms of anxiety warranting discontinuation of those medications. Despite cognitive and medical difficulties, he went on to complete a Bachelor's degree in historical preservation. As previously noted, Mr. Osborne is currently working as a  [employer] .     RELEVANT LABS/EXAMS:      No head/brain imaging/exams known or available for review.      TSH from 02/04/2024 was below normal limits; repeat  TSH 03/06/2024 and 04/10/2024 labs were abnormal.     MEDICATIONS: As noted in the patient's medical records,     albuterol sulfate 90 mcg/actuation; Inhale 2 puffs every 4 hours prn for wheezing or shortness of breath.  cholecalciferol (vitamin D3); 50,000 unit; Take 1 capsule (50,000 Units) by mouth once daily.  cider vinegar; Take 1 capsule by mouth once daily.  escitalopram oxalate (tablet); 20 mg Take 1 tablet (20 mg) by mouth once daily.  magnesium glycinate; Take 1 capsule by mouth if needed.    MENTAL STATUS, BEHAVIOR OBSERVATIONS, AND VALIDITY:      During interview, Mr. Osborne presented in no acute distress and was well groomed. He was alert and oriented to person, place and time. Upon casual observation, gait appeared normal; there were no apparent unintentional movements or dystonia. Mr. Osborne exhibited no difficulty with comprehension. Conversational speech was articulate and fluent with normal volume, rhythm, rate, and intonation and with no appreciable dysnomia. Some tangentiality was noted, though he generally returned to his original thought or benefited from redirection/cues. Expressed thoughts were logically-organized, reality-based, and appropriate to context.  Within the interview, Mr. Osborne recalled recent information with accuracy (short-term memory), retrieved remote personal history with ease (long-term memory), exhibited great insight into the current condition and the reason for the evaluation, and evidenced no apparent lapses in judgment. Affect was generally anxious, with appropriate brightening. There were no apparent visual/auditory hallucinations or delusions noted     During testing, rapport was quickly established. He exhibited anxiety and was aware of perceived failures though he appeared to give his best effort on all tasks; scores on performance validity measures were within normal limits. These results as interpreted are considered reliable and valid.      ASSESSMENT PROCEDURES:       04/15/2024 (Conducted In-Person):      Review of Records; Neurobehavioral Interview with Mr. Osborne alone; Examination of Task Engagement; Wechsler Test of Adult Reading (WTAR); Wechsler Adult Intelligence Scale-4th Ed. (WAIS-IV); Stroop Color Word Test; Trail Making Test (TMT); Judgment of Line Orientation (JOLO); Finger Tapping; [place] Card Sorting Test (WCST); Neuropsychological Assessment Battery (NAB) Naming subtest; Controlled Oral Word Association Test (COWA) Phonemic and Semantic Fluency; Ruff Figural Fluency Test (RFFT); Wechsler Memory Scale-4th Ed. (WMS-IV) Logical Memory and Visual Reproductions; [place] Verbal Learning Test, 2nd Ed. (CVLT-II); Extended Complex Figure Test (ECFT); Hernadez Depression Inventory- 2nd Ed. (BDI-II); Hernadez Anxiety Inventory (MAGED).     05/09/2024 (Conducted In-Person):      Interactive Feedback (Impressions/Recommendations, Patient Education) with Mr. Osborne alone; Coordination of care with other health care providers involved in the care plan; Integration, interpretation, and preparation of final report.      TEST RESULTS:      Premorbid intellectual ability was estimated to be in the high average range for verbal abilities and in the average range for visual-spatial abilities based on educational history and based on current testing that helps to estimate prior levels of ability.      On self-report screening of emotional functioning, depression ratings (BDI) were moderately to severely elevated.  Anxiety ratings (MAGED) were within normal limits.     Mr. Osborne scored below expectation on measures of fine motor speed/dexterity (Finger Tapping, low average dominant/right hand, low average-average non-dominant/left hand). With regard to executive functioning, his performance was variable. Specifically, he had relative weaknesses on tasks with a speeded component, such as spatial generativity (RFFT, low average), verbal generativity (COWA, low average-average range), cognitive  set shifting (TMT B, low average-average range;without error but 1.5 standard deviations weaker than his proficient TMT A performance). On a complex problem-solving task (WCST), he demonstrated difficulty establishing an effective approach to responsive feedback (Total Errors, low average, % Perseverative Errors, borderline), though he did not have any failures to maintain set and identified all 6 (out of 6) sorting principles.     Otherwise, he performed within expectation on other measures of executive functioning (Stroop Interference, average; WAIS-IV Similarities, average; WAIS-IV EVELIN, average), attention/concentration/working memory (e.g., WAIS-IV Digit Span, average-high average; Letter-Number Sequencing, average, CVLT-II Trial 1, average; List B, low average), processing speed (WAIS-IV Symbol Search, superior; Coding, low average-average; TMT-A, average-high average), spatial perception and construction (JOLO, at least high average; Block Design, average; ECFT Copy, average), and receptive and expressive language such as expressive vocabulary (WAIS-IV Vocabulary, average, confrontational naming (BNT, at least high average), semantic retrieval (Semantic Fluency, low average-average and consistent with phonemic fluency), and rapid automatic naming (Stroop Word & Color Trials, average).      On memory measures, he demonstrated an isolated weakness in delayed recall of various shapes (WMS-IV WMS-IV Visual Reproductions II low average-average vs.  high average-superior on VR I), but he improved with recognition cues (WMS-IV VR Recognition, at least average-high average). On all other measures, recall was stable or even improved across all other measures (e.g., WMS-IV Logical Memory I & II, high average; CVLT-II Short and Long Delay Free Recall, average; ECFT Immediate and Delayed Recall, average), with some additional benefit from a recognition format on one test (ECFT Delayed Recognition, average-high average).  Overall, Mr. Osborne demonstrated a normal capacity for acquisition, retrieval and retention of new information, scoring in the average to high average range on key indices across all measures (WMS-IV Logical Memory; WMS-IV Visual Reproductions; CVLT-II; ECFT).      CONCLUSIONS AND DIAGNOSTIC IMPRESSIONS:    Mr. Osborne presented with a history of DYT1 dystonia (s/p bilateral GPi DBS in 2008) with complaints of longstanding memory difficulty andmore notable changes since 2020. Multiple stressors were identified which preceded and/or spanned this same period of time.  Formal screening inventories within this exam documented moderate to severe levels of depression symptoms; anxiety rating on objective screeners were within normal limits though worry/anxiety was noted throughout. Objective cognitive testing yielded a profile characterized by relative weaknesses on measures of fine motor speed (consistent with history of dystonia) and aspects of executive functioning (particularly on timed measures). With the exception of a subtle retrieval inefficiency on one visual memory task (improved to normal ranges with cues), memory testing was showed adequate retention/consolidation of information. Mr. Osborne scored in the normal range on measures of attention/concentration, processing speed, and visual-spatial perception and construction. Importantly, despite Mr. Osborne' concerns regarding expressive language and word finding, scores on measures of receptive and expressive language were largely within expectation.     Taken together, his profile appears to reflect contributions from depression, stress, anxiety, unrestful sleep, and fatigue. That is, his cognitive and memory capabilities are well preserved, but in everyday situations transient lapses in cognitive efficiency due to stress/mood affect the ease of retrieving information, including words/vocabulary.  We are confident that more targeted management of stress, anxiety,  depression, and sleep disruption will substantially improve Mr. Osborne' cognitive functioning in everyday activities and improve overall quality of life.      These findings are consistent with complaints of memory difficulty (ICD R41.3), psychomotor slowing/deficits (ICD R41.843), and executive deficits (ICD R41.844), consistent with dystonia (G24.9) with exacerbation from depression (ICD F32.A), stress (ICD F43.8), anxiety (ICD F41.9), and sleep disturbances (ICD G47.9).     RECOMMENDATIONS    Mr. Osborne should continue to follow up with Bartolome Hoffmann CNP and his other providers in Neurology for ongoing monitoring and management of dystonia.     Although Mr. Osborne has been precribed psychiatric medications for several years, he denied any notable benefit/changes in symptoms and was open to consultation with psychiatry for more targeted management of depression. Hasmukh Garcia is a neuropsychiatrist in Psychiatry who specializes in managing emotional and behavioral challenges associated with neurological conditions ([phone]) and could be especially helpful.    Although Mr. Osborne is currently working with a therapist, Neuropsychological rehabilitation would be beneficial for developing compensation strategies to limit the impact of cognitive inefficiencies on everyday activities of daily living.  Dysfunctional thought processes (e.g., catastrophic thinking) might also be incorporated into his rehabilitation.  Consultation with Dr. Afua Harp, Dr. Ernestina Mendoza, or Dr. Kerrie Lanier in the  Neuropsychological Rehabilitation Program ([phone]) would be beneficial to facilitate recovery of modifiable deficits, to develop compensatory strategies, and develop a plan to explore career changes and needs.     Restorative sleep (7-9 hours for adults) contributes to good physical and brain health and is critical to daytime alertness and safety, cognitive functioning, school/work performance, mood, and  interpersonal relationships.  The following behavioral strategies and environmental modifications can improve onset, duration and quality of sleep:  Set a fixed bedtime and waking time every day.  Avoid napping during the day.   Avoid alcohol, caffeine and heavy, spicy, or sugary foods 4-6 hours before bedtime.   Exercise regularly, but not right before going to bed.  Block out all distractions by turning off - or even removing from the room - electronic devices such as TV, computer, phone, video player, audio player, garth device, etc.  Reading for pleasure is an excellent substitute for activities on electronic devices for the purpose of improving sleep onset.  Use comfortable bedding, set a comfortable temperature, and keep the room well ventilated.  Do not use the bed as an office, workroom, or recreation room.   Establish a pre-sleep ritual, such as a warm bath or a few minutes of reading.  If prone to anxiety, relaxation techniques such as yoga and deep breathing can be helpful.     If lifestyle changes and behavioral interventions do not improve sleep appreciably, a sleep study might be warranted as already discussed with sleep medicine.     Structural and organizational reinforcements are often helpful for individuals with reported subjective cognitive difficulties. He might benefit from the following strategies:   a.  Following a routine and consistent daily schedule.    b.  Continuing to use a single planner, calendar, or electronic organizer to plan and manage appointment dates, activities, and tasks.   c.  Breaking tasks into smaller, more manageable steps; work on one task at a time until it is completed.   d.  Keeping a single list of tasks, listed by priority, and marking them when complete.   e.  Placing reminders in places where they are easily noticed (e.g., a note on door).   f.  Using a mobile phone or electronic calendar to set timers for tasks and events (e.g., medications, appointments).   g.   Writing down important information (e.g., tasks, conversational details, list items) immediately, to strengthen encoding and for later reference. This will also help to free up cognitive resources to focus on the task at hand.   h.  Associating new information with older, previously stored information (e.g., familiar categories, anecdotes, references, reminders).  i.    Strategies such as putting information in context (i.e., stories) or using visual imagery may help to learn/retrieve information.   j.  Using recognition cue cards in everyday memory situations that come up with frequency, such as lists of routine household tasks, errands, or grocery items to prompt recall.   k.  Having specific locations for frequently used items (e.g., keys, wallet, phone), especially for items most often needed when exiting home.     Activities and Lifestyle Changes to Maintain Brain Health and Cognitive Functioning:    Physical exercise carries many benefits.  It reduces stress and anxiety, elevates mood, improves quality of sleep, and improves cardiac/vascular health, brain health, and overall physical well-being.  Incorporating exercise into our daily routine is ideal (even just walking for 30 minutes at lunch or after work); however, any exercise is better than no exercise, and more exercise is better than less exercise.    The following activities and interventions are recommended for optimizing brain health and preserving cognitive function:  the “MIND diet” for heart and brain health;  good stress management (e.g., relaxation techniques);  management of any vascular risks (e.g., diabetes, hypertension, cholesterol);  30-60 minutes of daily aerobic exercise (e.g., walking, swimming);  social engagement (e.g., getting together with other people regularly); and   cognitively stimulating activities (e.g., playing cards, board games, computer games; taking classes, joining study/discussion groups, pursuing a new hobby;  completing crossword, Sudoku, word-search and other puzzles; reading books about new topics, cultures, or geographical areas).      Good hydration is important to optimal cognitive functioning and has many other health benefits as well (e.g., increased energy, better mood, and prevention/reduction of headache, dizziness, and other health symptoms/problems).  Guidelines vary depending on multiple factors, but several general principles are consistently endorsed:  At a minimum, drink fluids whenever you feel thirsty; plain water is the best source of fluid intake; and limit caffeine and alcohol, which reduce fluid in the body.  Your primary care provider can provide specific guidelines for you personally.    If there are significant concerns of cognitive decline in the future, the present exam will provide a comprehensive baseline for comparison to assess the nature and severity of any changes and to characterize Mr. Osborne' needs at that time.    We reviewed and discussed results, impressions, and recommendations with Mr. Osborne at the conclusion of the evaluation.  We provided patient education regarding the various causes of memory complaints (e.g., stress/anxiety/depression, sleep, neurological conditions), and answered all questions to his satisfaction at that time.  We also previewed evidence-based activities and health behaviors to promote brain health and to preserve cognitive functioning, which are detailed in the recommendations above.      Thank you for the opportunity to participate in Mr. Osborne' evaluation and care.  If I can provide any additional assistance, please do not hesitate to contact me at [phone].    Sincerely,    Shabnam Butcher PsyD  Postdoctoral Fellow in Clinical Neuropsychology        Xavi Patterson, PhD  Clinical Neuropsychologist  Professor of Neurology, [employer] [facility] [employer]  Former Director of Clinical Neuropsychology, [facility]  [facility]  Fellow of the National Academy of Neuropsychology  Fellow of the American Psychological Association  Fellow of the Sports Neuropsychology Society  Fellow of the American Epilepsy Society    ICD R41.3; R41.843; R41.844; G24.9; ICD F32.A; F43.8; F41.9; G47.9.  19942=4; 71184=8; 68576=6; 51849=4; 16769=1    Orig: Bartolome Hoffmann CNP, Neurology, [facility]  cc: Arabella Posadas MD, [facility], [facility]  cc: Hasmukh Garcia MD, Neuropsychiatry/Psychiatry, [facility]  cc: Cathi Kiran MD, Internal Medicine, [facility]  cc:  Yadiel India (Patient Clinical Summary)  cc: Neuropsychology File    Attending Provider's Attestation:  I saw and evaluated the patient. I personally obtained the key and critical portions of the neuropsychological exam or was physically present for key and critical portions performed by the fellow. I consulted with the fellow, reviewed/revised all documentation, and agree with the diagnostic impressions and neuropsychological findings.

## 2024-07-23 ENCOUNTER — APPOINTMENT (OUTPATIENT)
Dept: PSYCHOLOGY | Facility: CLINIC | Age: 29
End: 2024-07-23
Payer: COMMERCIAL

## 2024-07-24 ENCOUNTER — OFFICE VISIT (OUTPATIENT)
Dept: PSYCHOLOGY | Facility: CLINIC | Age: 29
End: 2024-07-24
Payer: COMMERCIAL

## 2024-07-24 DIAGNOSIS — R53.83 FATIGUE, UNSPECIFIED TYPE: ICD-10-CM

## 2024-07-24 DIAGNOSIS — F32.0 DEPRESSION, MAJOR, SINGLE EPISODE, MILD (CMS-HCC): Primary | ICD-10-CM

## 2024-07-24 DIAGNOSIS — G24.3 CERVICAL DYSTONIA: ICD-10-CM

## 2024-07-24 DIAGNOSIS — R41.9 COGNITIVE COMPLAINTS: ICD-10-CM

## 2024-07-24 DIAGNOSIS — F41.1 GENERALIZED ANXIETY DISORDER: ICD-10-CM

## 2024-07-24 PROCEDURE — 90832 PSYTX W PT 30 MINUTES: CPT | Mod: AH

## 2024-07-24 PROCEDURE — 90837 PSYTX W PT 60 MINUTES: CPT

## 2024-07-24 PROCEDURE — 90837 PSYTX W PT 60 MINUTES: CPT | Mod: AH

## 2024-07-24 PROCEDURE — 90832 PSYTX W PT 30 MINUTES: CPT

## 2024-07-24 NOTE — PROGRESS NOTES
NEUROPSYCHOLOGICAL REHABILITATION NOTE    Name: Yadiel Osborne   MRN: [id number]   Age: 29 y.o.   Date of Service: 2024  Reason For Visit: Neuropsychological Rehabilitation     Reason For Referral  Mr. aYdiel Osborne is a 29 y.o. male who was referred by Dr. Patterson for neuropsychological rehabilitation due to depression/anxiety, as well as history of cervical dystonia.     Goals:   1) Develop strategies to manage depression/anxiety   2) Develop adaptive responses to negative self talk    Session Details:   He arrived on time and unaccompanied.     The agenda for the session involved completing a check-in, reviewing home practice, and discussing values. During the check-in, the patient indicated continuing to struggle with anxiety. He has tried unhooking skills (naming the thought/story) with minimal effectiveness. The home practice was completed to read chapters 10-12 of the Happiness Trap (completed), practice cognitive defusion techniques (unhelpful), and complete value exercise (completed).  We reviewed concepts in the book, where we emphasized the struggle switch and trying to turn it off by focusing on values. We discussed certain prompts for assessing values (birthday/), but he struggled to expand on values. As such, he completed the value card sorting test with him identifying his top 5 values and behaviors: health (eating healthy, exercising, and attending therapy), happiness, community (meeting new people, volunteering, and recognizing humanity in suffering), ecology/environment, and relationships.      Diagnosis:   1. Depression, major, single episode, mild (CMS-HCC)    2. Cognitive complaints    3. Cervical dystonia    4. Fatigue, unspecified type    5. Generalized anxiety disorder      Plan:   The next in-person session will be on  at 3PM. For home practice, he will read chapters  24-27 of the Happiness Trap, generate behaviors consistent with values, and document behaviors consistent with  values. The plan for next session will be to complete a check-in, review home practice, and discuss committing to action.     Time:   1794-4253

## 2024-07-27 PROBLEM — G24.1: Status: ACTIVE | Noted: 2024-07-27

## 2024-07-31 ENCOUNTER — OFFICE VISIT (OUTPATIENT)
Dept: PSYCHOLOGY | Facility: CLINIC | Age: 29
End: 2024-07-31
Payer: COMMERCIAL

## 2024-07-31 DIAGNOSIS — R53.83 FATIGUE, UNSPECIFIED TYPE: ICD-10-CM

## 2024-07-31 DIAGNOSIS — G24.3 CERVICAL DYSTONIA: ICD-10-CM

## 2024-07-31 DIAGNOSIS — F41.1 GENERALIZED ANXIETY DISORDER: ICD-10-CM

## 2024-07-31 DIAGNOSIS — F32.0 DEPRESSION, MAJOR, SINGLE EPISODE, MILD (CMS-HCC): Primary | ICD-10-CM

## 2024-07-31 DIAGNOSIS — R41.9 COGNITIVE COMPLAINTS: ICD-10-CM

## 2024-07-31 PROCEDURE — 90837 PSYTX W PT 60 MINUTES: CPT | Mod: AH

## 2024-07-31 PROCEDURE — 90837 PSYTX W PT 60 MINUTES: CPT

## 2024-07-31 NOTE — PROGRESS NOTES
NEUROPSYCHOLOGICAL REHABILITATION NOTE    Name: Yadiel Osborne   MRN: [id number]   Age: 29 y.o.   Date of Service: 7/31/2024  Reason For Visit: Neuropsychological Rehabilitation     Reason For Referral  Mr. Yadiel Osborne is a 29 y.o. male who was referred by Dr. Patterson for neuropsychological rehabilitation due to depression/anxiety, as well as history of cervical dystonia.     Goals:   1) Develop strategies to manage depression/anxiety   2) Develop adaptive responses to negative self talk    Session Details:   He arrived on time and unaccompanied.     The agenda for the session involved completing a check-in, reviewing home practice, and discussing being present. During the check-in, he reported concerns with insurance covering sessions; we discussed this at length and problem solved. He continues to report anxiety about memory concerns, possible undiagnosed ADHD, and not feeling relief from treatment. We processed this in session, and we discussed treatment/diagnostic options. The home practice was reviewed to read chapters 24-27 of the Happiness Trap (completed), generate behaviors consistent with values (completed), and document behaviors consistent with values (completed in mind). He indicated struggling the most with the value of happiness, where he indicated struggling to be present due to thoughts. We discussed practicing being present by defusing from thoughts. Additionally, he was introduced to leaves on a stream (handout and video provided).     Diagnosis:   1. Depression, major, single episode, mild (CMS-HCC)    2. Cognitive complaints    3. Cervical dystonia    4. Fatigue, unspecified type    5. Generalized anxiety disorder        Plan:   He will contact the provider after discussing billing concerns, and he will determine if he can proceed with treatment. For home practice, he will practice cognitive defusion, practice leaves on a stream, and contact his insurance. The plan for next session will be to  complete a check-in, review home practice, and discuss being present.     Time:   2613-9210 (insurance discussion-not billable)   [id number]

## 2024-08-14 ENCOUNTER — APPOINTMENT (OUTPATIENT)
Dept: PSYCHOLOGY | Facility: CLINIC | Age: 29
End: 2024-08-14
Payer: COMMERCIAL

## 2024-08-14 ENCOUNTER — TELEPHONE (OUTPATIENT)
Dept: PSYCHOLOGY | Facility: CLINIC | Age: 29
End: 2024-08-14
Payer: COMMERCIAL

## 2024-08-14 NOTE — PROGRESS NOTES
Patient called to share he was not feeling well, so we rescheduled the appointment to 08/21 at 3PM.

## 2024-08-15 ENCOUNTER — TELEPHONE (OUTPATIENT)
Dept: PRIMARY CARE | Facility: CLINIC | Age: 29
End: 2024-08-15
Payer: COMMERCIAL

## 2024-08-15 NOTE — TELEPHONE ENCOUNTER
Patient called states that he has been struggling more with his anxiety. Wanted to know if you could write him a letter to extend him having more time off.

## 2024-08-21 ENCOUNTER — OFFICE VISIT (OUTPATIENT)
Dept: PSYCHOLOGY | Facility: CLINIC | Age: 29
End: 2024-08-21
Payer: COMMERCIAL

## 2024-08-21 ENCOUNTER — OFFICE VISIT (OUTPATIENT)
Dept: PRIMARY CARE | Facility: CLINIC | Age: 29
End: 2024-08-21
Payer: COMMERCIAL

## 2024-08-21 VITALS
WEIGHT: 171.8 LBS | BODY MASS INDEX: 26.97 KG/M2 | HEIGHT: 67 IN | OXYGEN SATURATION: 99 % | HEART RATE: 75 BPM | DIASTOLIC BLOOD PRESSURE: 69 MMHG | SYSTOLIC BLOOD PRESSURE: 116 MMHG | TEMPERATURE: 98.6 F

## 2024-08-21 DIAGNOSIS — F32.0 DEPRESSION, MAJOR, SINGLE EPISODE, MILD (CMS-HCC): Primary | ICD-10-CM

## 2024-08-21 DIAGNOSIS — R53.83 FATIGUE, UNSPECIFIED TYPE: ICD-10-CM

## 2024-08-21 DIAGNOSIS — R41.9 COGNITIVE COMPLAINTS: ICD-10-CM

## 2024-08-21 DIAGNOSIS — F41.1 GENERALIZED ANXIETY DISORDER: ICD-10-CM

## 2024-08-21 DIAGNOSIS — R53.83 OTHER FATIGUE: ICD-10-CM

## 2024-08-21 DIAGNOSIS — Z00.00 ROUTINE MEDICAL EXAM: ICD-10-CM

## 2024-08-21 DIAGNOSIS — G24.3 CERVICAL DYSTONIA: ICD-10-CM

## 2024-08-21 PROCEDURE — 1036F TOBACCO NON-USER: CPT

## 2024-08-21 PROCEDURE — 3008F BODY MASS INDEX DOCD: CPT | Performed by: FAMILY MEDICINE

## 2024-08-21 PROCEDURE — 99215 OFFICE O/P EST HI 40 MIN: CPT | Performed by: FAMILY MEDICINE

## 2024-08-21 PROCEDURE — 90837 PSYTX W PT 60 MINUTES: CPT

## 2024-08-21 PROCEDURE — 90837 PSYTX W PT 60 MINUTES: CPT | Mod: AH

## 2024-08-21 ASSESSMENT — ANXIETY QUESTIONNAIRES
IF YOU CHECKED OFF ANY PROBLEMS ON THIS QUESTIONNAIRE, HOW DIFFICULT HAVE THESE PROBLEMS MADE IT FOR YOU TO DO YOUR WORK, TAKE CARE OF THINGS AT HOME, OR GET ALONG WITH OTHER PEOPLE: SOMEWHAT DIFFICULT
1. FEELING NERVOUS, ANXIOUS, OR ON EDGE: NEARLY EVERY DAY
4. TROUBLE RELAXING: NEARLY EVERY DAY
2. NOT BEING ABLE TO STOP OR CONTROL WORRYING: NEARLY EVERY DAY
7. FEELING AFRAID AS IF SOMETHING AWFUL MIGHT HAPPEN: MORE THAN HALF THE DAYS
5. BEING SO RESTLESS THAT IT IS HARD TO SIT STILL: NEARLY EVERY DAY
6. BECOMING EASILY ANNOYED OR IRRITABLE: SEVERAL DAYS
GAD7 TOTAL SCORE: 18
3. WORRYING TOO MUCH ABOUT DIFFERENT THINGS: NEARLY EVERY DAY

## 2024-08-21 ASSESSMENT — PATIENT HEALTH QUESTIONNAIRE - PHQ9
6. FEELING BAD ABOUT YOURSELF - OR THAT YOU ARE A FAILURE OR HAVE LET YOURSELF OR YOUR FAMILY DOWN: SEVERAL DAYS
SUM OF ALL RESPONSES TO PHQ QUESTIONS 1-9: 11
SUM OF ALL RESPONSES TO PHQ9 QUESTIONS 1 AND 2: 4
3. TROUBLE FALLING OR STAYING ASLEEP OR SLEEPING TOO MUCH: SEVERAL DAYS
2. FEELING DOWN, DEPRESSED OR HOPELESS: MORE THAN HALF THE DAYS
10. IF YOU CHECKED OFF ANY PROBLEMS, HOW DIFFICULT HAVE THESE PROBLEMS MADE IT FOR YOU TO DO YOUR WORK, TAKE CARE OF THINGS AT HOME, OR GET ALONG WITH OTHER PEOPLE: VERY DIFFICULT
7. TROUBLE CONCENTRATING ON THINGS, SUCH AS READING THE NEWSPAPER OR WATCHING TELEVISION: SEVERAL DAYS
5. POOR APPETITE OR OVEREATING: SEVERAL DAYS
9. THOUGHTS THAT YOU WOULD BE BETTER OFF DEAD, OR OF HURTING YOURSELF: SEVERAL DAYS
1. LITTLE INTEREST OR PLEASURE IN DOING THINGS: MORE THAN HALF THE DAYS
4. FEELING TIRED OR HAVING LITTLE ENERGY: MORE THAN HALF THE DAYS
8. MOVING OR SPEAKING SO SLOWLY THAT OTHER PEOPLE COULD HAVE NOTICED. OR THE OPPOSITE, BEING SO FIGETY OR RESTLESS THAT YOU HAVE BEEN MOVING AROUND A LOT MORE THAN USUAL: NOT AT ALL

## 2024-08-21 ASSESSMENT — ENCOUNTER SYMPTOMS
LOSS OF SENSATION IN FEET: 0
DEPRESSION: 1
OCCASIONAL FEELINGS OF UNSTEADINESS: 0

## 2024-08-21 NOTE — PROGRESS NOTES
"Subjective   Patient ID: Yadiel Osborne is a 29 y.o. male who presents for Fatigue.  Patient here with complaints of extreme fatigue.  May be worse than had been.  Continues to have struggles with mental health.   Was evaluated by neuropsych.  Has been working with psych CNP.  Has been trying different medications.  Always feels bad when on meds.  Currently on escitalopram.  Not taking any medications now.  Did a long taper off of the lexapro.  Is also working with therapist, but doesn't feel it has been that helpful so far.  Continues to work, interested in having letter to be able to take time off for days when feels bad.  Has general dissatisfaction with life.  Not suicidal.  Wants to go to [facility] next year.  Needs form completed to start the process.  He feels he is healthy enough, and is hoping this will bring some meaning into his life.            Review of Systems    Objective   /69   Pulse 75   Temp 37 °C (98.6 °F)   Ht 1.702 m (5' 7\")   Wt 77.9 kg (171 lb 12.8 oz)   SpO2 99%   BMI 26.91 kg/m²     Physical Exam  Vitals reviewed.   Constitutional:       Appearance: Normal appearance.   Neck:      Thyroid: No thyromegaly.   Cardiovascular:      Rate and Rhythm: Normal rate and regular rhythm.      Heart sounds: No murmur heard.  Pulmonary:      Effort: Pulmonary effort is normal.      Breath sounds: Normal breath sounds.   Musculoskeletal:      Right lower leg: No edema.      Left lower leg: No edema.   Lymphadenopathy:      Cervical: No cervical adenopathy.   Neurological:      Mental Status: He is alert.   Psychiatric:         Mood and Affect: Mood normal.         Behavior: Behavior normal.         Thought Content: Thought content normal.           Assessment/Plan   Problem List Items Addressed This Visit       Depression, major, single episode, mild (CMS-HCC) - Primary    Generalized anxiety disorder     Other Visit Diagnoses       Routine medical exam        Relevant Orders    Vitamin B12    " Other fatigue        Relevant Orders    Referral to [facility]    CBC and Auto Differential    Comprehensive Metabolic Panel    Vitamin D 25-Hydroxy,Total (for eval of Vitamin D levels)    Vitamin B12    TSH with reflex to Free T4 if abnormal    Sedimentation Rate    Testosterone, total and free

## 2024-08-22 NOTE — PATIENT INSTRUCTIONS
Ongoing fatigue, low energy.  Check fasting blood work.    Depression and anxiety, ongoing, currently not on any medications  Previous medications caused side effects or did not seem effective.  Encouraged to continue with psych CNP.  Continue with therapist.  Discussed doing genetic testing for psych meds.  Order put in to [employer].  Kit is to be mailed to the home.  Also put order in for [facility] health provider to look at alternative treatments and evaluation for fatigue and mental health.    Will complete form to begin process for [place] trip.

## 2024-08-26 ENCOUNTER — LAB (OUTPATIENT)
Dept: LAB | Facility: LAB | Age: 29
End: 2024-08-26
Payer: COMMERCIAL

## 2024-08-26 DIAGNOSIS — R53.83 OTHER FATIGUE: ICD-10-CM

## 2024-08-26 DIAGNOSIS — Z00.00 ROUTINE MEDICAL EXAM: ICD-10-CM

## 2024-08-26 LAB
25(OH)D3 SERPL-MCNC: 28 NG/ML (ref 30–100)
ALBUMIN SERPL BCP-MCNC: 4.4 G/DL (ref 3.4–5)
ALP SERPL-CCNC: 55 U/L (ref 33–120)
ALT SERPL W P-5'-P-CCNC: 15 U/L (ref 10–52)
ANION GAP SERPL CALC-SCNC: 10 MMOL/L (ref 10–20)
AST SERPL W P-5'-P-CCNC: 18 U/L (ref 9–39)
BASOPHILS # BLD AUTO: 0.02 X10*3/UL (ref 0–0.1)
BASOPHILS NFR BLD AUTO: 0.3 %
BILIRUB SERPL-MCNC: 1.1 MG/DL (ref 0–1.2)
BUN SERPL-MCNC: 15 MG/DL (ref 6–23)
CALCIUM SERPL-MCNC: 9.1 MG/DL (ref 8.6–10.3)
CHLORIDE SERPL-SCNC: 102 MMOL/L (ref 98–107)
CO2 SERPL-SCNC: 32 MMOL/L (ref 21–32)
CREAT SERPL-MCNC: 0.91 MG/DL (ref 0.5–1.3)
EGFRCR SERPLBLD CKD-EPI 2021: >90 ML/MIN/1.73M*2
EOSINOPHIL # BLD AUTO: 0.18 X10*3/UL (ref 0–0.7)
EOSINOPHIL NFR BLD AUTO: 3 %
ERYTHROCYTE [DISTWIDTH] IN BLOOD BY AUTOMATED COUNT: 11.9 % (ref 11.5–14.5)
ERYTHROCYTE [SEDIMENTATION RATE] IN BLOOD BY WESTERGREN METHOD: 1 MM/H (ref 0–15)
GLUCOSE SERPL-MCNC: 87 MG/DL (ref 74–99)
HCT VFR BLD AUTO: 40.8 % (ref 41–52)
HGB BLD-MCNC: 13.5 G/DL (ref 13.5–17.5)
IMM GRANULOCYTES # BLD AUTO: 0.01 X10*3/UL (ref 0–0.7)
IMM GRANULOCYTES NFR BLD AUTO: 0.2 % (ref 0–0.9)
LYMPHOCYTES # BLD AUTO: 2.22 X10*3/UL (ref 1.2–4.8)
LYMPHOCYTES NFR BLD AUTO: 36.9 %
MCH RBC QN AUTO: 28.7 PG (ref 26–34)
MCHC RBC AUTO-ENTMCNC: 33.1 G/DL (ref 32–36)
MCV RBC AUTO: 87 FL (ref 80–100)
MONOCYTES # BLD AUTO: 0.59 X10*3/UL (ref 0.1–1)
MONOCYTES NFR BLD AUTO: 9.8 %
NEUTROPHILS # BLD AUTO: 2.99 X10*3/UL (ref 1.2–7.7)
NEUTROPHILS NFR BLD AUTO: 49.8 %
NRBC BLD-RTO: 0 /100 WBCS (ref 0–0)
PLATELET # BLD AUTO: 196 X10*3/UL (ref 150–450)
POTASSIUM SERPL-SCNC: 4.6 MMOL/L (ref 3.5–5.3)
PROT SERPL-MCNC: 6.9 G/DL (ref 6.4–8.2)
RBC # BLD AUTO: 4.71 X10*6/UL (ref 4.5–5.9)
SODIUM SERPL-SCNC: 139 MMOL/L (ref 136–145)
TSH SERPL-ACNC: 2.03 MIU/L (ref 0.44–3.98)
VIT B12 SERPL-MCNC: 786 PG/ML (ref 211–911)
WBC # BLD AUTO: 6 X10*3/UL (ref 4.4–11.3)

## 2024-08-26 PROCEDURE — 80053 COMPREHEN METABOLIC PANEL: CPT

## 2024-08-26 PROCEDURE — 84402 ASSAY OF FREE TESTOSTERONE: CPT

## 2024-08-26 PROCEDURE — 84443 ASSAY THYROID STIM HORMONE: CPT

## 2024-08-26 PROCEDURE — 36415 COLL VENOUS BLD VENIPUNCTURE: CPT

## 2024-08-26 PROCEDURE — 85025 COMPLETE CBC W/AUTO DIFF WBC: CPT

## 2024-08-26 PROCEDURE — 82306 VITAMIN D 25 HYDROXY: CPT

## 2024-08-26 PROCEDURE — 82607 VITAMIN B-12: CPT

## 2024-08-26 PROCEDURE — 85652 RBC SED RATE AUTOMATED: CPT

## 2024-08-28 ENCOUNTER — TELEPHONE (OUTPATIENT)
Dept: PSYCHOLOGY | Facility: CLINIC | Age: 29
End: 2024-08-28
Payer: COMMERCIAL

## 2024-08-28 ENCOUNTER — APPOINTMENT (OUTPATIENT)
Dept: PSYCHOLOGY | Facility: CLINIC | Age: 29
End: 2024-08-28
Payer: COMMERCIAL

## 2024-08-28 NOTE — PROGRESS NOTES
Patient contacted about switching appointment to virtual. He indicated preference to reschedule appointment on 9/18 at 2PM.

## 2024-08-30 LAB
TESTOSTERONE FREE (CHAN): 88.1 PG/ML (ref 35–155)
TESTOSTERONE,TOTAL,LC-MS/MS: 479 NG/DL (ref 250–1100)

## 2024-09-12 ENCOUNTER — PROCEDURE VISIT (OUTPATIENT)
Dept: NEUROLOGY | Facility: HOSPITAL | Age: 29
End: 2024-09-12
Payer: COMMERCIAL

## 2024-09-12 ENCOUNTER — APPOINTMENT (OUTPATIENT)
Dept: NEUROLOGY | Facility: HOSPITAL | Age: 29
End: 2024-09-12
Payer: COMMERCIAL

## 2024-09-12 VITALS
TEMPERATURE: 96.7 F | DIASTOLIC BLOOD PRESSURE: 67 MMHG | WEIGHT: 167 LBS | BODY MASS INDEX: 26.21 KG/M2 | HEART RATE: 74 BPM | SYSTOLIC BLOOD PRESSURE: 109 MMHG | RESPIRATION RATE: 18 BRPM | HEIGHT: 67 IN

## 2024-09-12 DIAGNOSIS — F41.1 GENERALIZED ANXIETY DISORDER: ICD-10-CM

## 2024-09-12 DIAGNOSIS — G24.1: Primary | ICD-10-CM

## 2024-09-12 PROCEDURE — 95983 ALYS BRN NPGT PRGRMG 15 MIN: CPT | Performed by: NURSE PRACTITIONER

## 2024-09-12 RX ORDER — ESCITALOPRAM OXALATE 5 MG/1
5 TABLET ORAL DAILY
COMMUNITY

## 2024-09-12 NOTE — PATIENT INSTRUCTIONS
# DBS adjusted today. You are going home on group D--amplitude is at 2.5    If any side effects (vision changes, facial pulling, speech, feeling weird), then please go back to B.     You can increase group D every week by 0.1v (1 click w/ your remote) as tolerated.     You can always go back to baseline group B (group you came in on)    #Follow up in 4-5 months with Dr. Posadas, see me sooner for DBS if needed     Bartolome Hoffmann, NP-C  Adult/Gerontological Nurse Practitioner   [facility], Department of Neurology  [facility] [place]  [facility]  [address] [id number]  Phone: [phone]  Fax: [phone]

## 2024-09-12 NOTE — PROGRESS NOTES
"Subjective     Yadiel MATTI Osborne is a 29 y.o. year old male who presents with dystonia, here for follow up visit.    HPI    Last visit with me 2/8/24 and DBS adjusted for dystonia, mostly felt in LLE when walking.   Sent home on Group B which was higher freq than previous settings  Group D was also new to try (double monopolar)---this was the least tolerable group.     States he is doing \"OK\". Walking can be difficult for a week or 2, then going away. Stress/anxiety can worsen dystonia or when he has to walk (cross walks or waiting for someone to call his name), he is worried he will limp.     LLE-like he locks up/freezes and has to stop and resets himself. Feels he may limp or not move fluidly and resets and goes away.    Days he feels worse he feels the other groups are too strong--feels it \"in my brain\" he describes as tension or lack of control and pulling in L face.    No falls.    R chest IPG feels it is poking his skin a little bit, denies redness, swelling or pain, is more aware of it.     NP testing with Dr. Patterson 5/9/24  Taken together, his profile appears to reflect contributions from depression, stress, anxiety, unrestful sleep, and fatigue. That is, his cognitive and memory capabilities are well preserved, but in everyday situations transient lapses in cognitive efficiency due to stress/mood affect the ease of retrieving information, including words/vocabulary.  We are confident that more targeted management of stress, anxiety, depression, and sleep disruption will substantially improve Mr. Osborne' cognitive functioning in everyday activities and improve overall quality of life.       These findings are consistent with complaints of memory difficulty (ICD R41.3), psychomotor slowing/deficits (ICD R41.843), and executive deficits (ICD R41.844), consistent with dystonia (G24.9) with exacerbation from depression (ICD F32.A), stress (ICD F43.8), anxiety (ICD F41.9), and sleep disturbances (ICD G47.9).      He " "is doing NP rehab for dep/anx, working with psych NP. Not sure helpful. On Lexapro-not sure helpful.                  Current Outpatient Medications:     albuterol (Ventolin HFA) 90 mcg/actuation inhaler, Inhale 2 puffs every 4 hours if needed for wheezing or shortness of breath., Disp: 8 g, Rfl: 1    APPLE CIDER VINEGAR ORAL, Take 1 capsule by mouth once daily., Disp: , Rfl:     cholecalciferol (Vitamin D-3) 50,000 unit capsule, Take 1 capsule (50,000 Units) by mouth once daily., Disp: , Rfl:     escitalopram (Lexapro) 5 mg tablet, Take 1 tablet (5 mg) by mouth once daily., Disp: , Rfl:     MAGNESIUM GLYCINATE ORAL, Take 1 capsule by mouth if needed., Disp: , Rfl:        Objective   Vitals:    09/12/24 0943   BP: 109/67   Pulse: 74   Resp: 18   Temp: 35.9 °C (96.7 °F)   Weight: 75.8 kg (167 lb)   Height: 1.702 m (5' 7\")             Physical Exam  Not able to see dystonia on exam--more so how patient felt when he was walking.     R chest DBS IPG if he pushes on it the corner will be right under his skin (so likely positional) but no pain, redness, swelling or open areas       Procedure note:  Bilateral DBS rechargeable IPGs-- hour per side once a week    L chest IPG, L GPi  Activa RC: Nov 2021  JOHANNA: 11/2035  Battery 100%,   Th impedance:722 ohms 3.8mA--unchanged  Electrode impedances  wnl    Group A   L GPI:C+ 2-/2.8V/120us/60Hz    No changes.     R chest IPG, R GPi  Activa RC:   JOHANNA: 11/2035  Battery: 100%  Electrode impedances OK  Th impedances: 552ohms/4.9mA--unchanged     Initial settings:  Group B:  C+1-2-/2.8V/120Us/70Hz    Programming  Group D deleted since not tolerated   C+2-3- 2.5/120/70    Made new group D below,  C+1- /freq 60-up to 5.0 without SE    Group A deleted (old group, 2021 or older) has not been using  C+2-/3.3V/120us/70Hz      Final settings    C+1- 2.5/120/60 ((0-3.3))    Group C--same as B but with slightly higher freq 2/8/24  C+1-2- 2.8V/120Us/90Hz    Group B--settings made last visit " " 1/2/24  C+1-2-/2.8V/120Us/70Hz       ---------------------Previous Programming---------------------------  2/2024  Group B Increasing freq 115 to SE \"lack of control\" on L side , increasing causing vision changes (white noise on a TV/artifact)--lowered PW to 110 and head still felt weird, 90 which was tolerated made this group C, kept B same settings    Higher PW in B makes his head feel weird.     Monopolar review  R GPi (L body)  C+0- SE 1.2--vision changes, speckles   C+1-SE 4.5 L cheek feeling like it wants to pull  C+2-SE 4.5 trouble speaking  C+3- no SE at 5.0     PLAN  Next visit can try C+1- if needed.      Final settings    Group C--same as B but with slightly higher freq 2/8/24  C+1-2- 2.8V/120Us/90Hz    Group B--settings made last visit  1/2/24  C+1-2-/2.8V/120Us/70Hz    Group D--new settings 2/8/24  C+2-3- 2.5/120/70     Group A:   C+2-/3.3V/120us/70Hz      Assessment/Plan   Mr. Yadiel Osborne is a 29 y.o. m here for follow up of bilateral Gpi DBS for his DYT1 generalized dystonia, initially implanted in [place] in 2008. He has had many years of excellent symptom control. He has exacerbation of LLE dystonia (he feels his gait is different/less fluid, sometimes other notice), however, not noted on exam. This is worsened by stress and anxiety and can talk himself out of it at times, likely more related to anxiety. He has good insight, is working with psychology and had NP testing for cognitive complaints thought to be d/t mood and sleep issues. Pending sleep study for fatigue.     DBS adjusted today, did discuss since he is currently on settings that provide great control, that he may have worsening on new settings though he wants to try and can always go back to previous settings.     Discussed he is doing everything correct in regards to anxiety and treatment/follow up.     Discussed DYT1 dystonia and symptoms/progression and questions related to DBS. In the past 20 years since he has had DBS his dysotnia " has been well controlled so this is reassuring.     Diagnoses and all orders for this visit:  DYT1 dystonia  Generalized anxiety disorder      #Monitor R chest battery site, if you have pain, redness, any more discomfort or area rubs through skin we need to know immediately.     # DBS adjusted today. You are going home on group D    You can always go back to baseline group B (group you came in on)    #Follow up in 4-5 months with Dr. Posadas, see me sooner for DBS if needed         Total time of 50 minutes for today's visit including chart review, of which 18 mins were spent with DBS programming as noted above-checking settings, stimulator events, energy level, and impedances and updating settings in the chart.      Bartolome Hoffmann NP-C  Adult/Gerontological Nurse Practitioner   [facility], Department of Neurology  [facility] [place]  [facility]  [address] [place] [address] [address] [id number]  Phone: [phone]  Fax: [phone]

## 2024-09-18 ENCOUNTER — OFFICE VISIT (OUTPATIENT)
Dept: PSYCHOLOGY | Facility: CLINIC | Age: 29
End: 2024-09-18
Payer: COMMERCIAL

## 2024-09-18 DIAGNOSIS — F32.0 DEPRESSION, MAJOR, SINGLE EPISODE, MILD (CMS-HCC): Primary | ICD-10-CM

## 2024-09-18 DIAGNOSIS — G24.3 CERVICAL DYSTONIA: ICD-10-CM

## 2024-09-18 DIAGNOSIS — R41.9 COGNITIVE COMPLAINTS: ICD-10-CM

## 2024-09-18 DIAGNOSIS — F41.1 GENERALIZED ANXIETY DISORDER: ICD-10-CM

## 2024-09-18 DIAGNOSIS — R53.83 FATIGUE, UNSPECIFIED TYPE: ICD-10-CM

## 2024-09-18 PROCEDURE — 90837 PSYTX W PT 60 MINUTES: CPT

## 2024-09-18 PROCEDURE — 90837 PSYTX W PT 60 MINUTES: CPT | Mod: AH

## 2024-09-18 NOTE — PROGRESS NOTES
NEUROPSYCHOLOGICAL REHABILITATION NOTE    Name: Yadiel Osborne   MRN: [id number]   Age: 29 y.o.   Date of Service: 9/18/2024  Reason For Visit: Neuropsychological Rehabilitation     Reason For Referral  Mr. Yadiel Osborne is a 29 y.o. male who was referred by Dr. Patterson for neuropsychological rehabilitation due to depression/anxiety, as well as history of cervical dystonia.     Goals:   1) Develop strategies to manage depression/anxiety   2) Develop adaptive responses to negative self talk    Session Details:   He arrived on time and unaccompanied.     The agenda for the session involved completing a check-in, reviewing home practice, discussing self-doubt, and learning about the noticing self. During the check-in, he reported the DBS settings were modified and made his walking worse; he returned to his baseline settings per neurology. He continues to try to coordinate with a group to move to [facility], where he expressed desire to move when his lease ends in 2024. The home practice was reviewed to practice being present when brushing his teeth (completed-increased anxiety), focus on comparing himself to past versions of himself (partially completed), and talking to his medical provider about psychiatric medication (pending scheduled medical appointment). The majority of the session focused on discussing his self-doubt. We discussed his main purpose of self-doubt is avoidance and comfort of knowing he has not made the wrong decision. The self-doubt manifests as uncontrolled negative thoughts. As such, he was introduced to the noticing self. We discussed the chessboard analogy, where he was encouraged to notice his thoughts versus struggle with them.     Diagnosis:   1. Depression, major, single episode, mild (CMS-HCC)    2. Cognitive complaints    3. Cervical dystonia    4. Fatigue, unspecified type    5. Generalized anxiety disorder      Plan:   The next in-person session is on 10/2 at 3PM. For home practice, he will  focus on the noticing self, continue with the concept of workability, and read chapters 14-16. The plan for next session will be to complete a check-in, review home practice, and discuss the concept of acceptance.     Time:

## 2024-10-02 ENCOUNTER — PROCEDURE VISIT (OUTPATIENT)
Dept: SLEEP MEDICINE | Facility: CLINIC | Age: 29
End: 2024-10-02
Payer: COMMERCIAL

## 2024-10-02 ENCOUNTER — OFFICE VISIT (OUTPATIENT)
Dept: PSYCHOLOGY | Facility: CLINIC | Age: 29
End: 2024-10-02
Payer: COMMERCIAL

## 2024-10-02 VITALS — WEIGHT: 171.96 LBS | HEIGHT: 67 IN | BODY MASS INDEX: 26.99 KG/M2

## 2024-10-02 DIAGNOSIS — F41.1 GENERALIZED ANXIETY DISORDER: ICD-10-CM

## 2024-10-02 DIAGNOSIS — G47.30 SLEEP-RELATED BREATHING DISORDER: ICD-10-CM

## 2024-10-02 DIAGNOSIS — R41.9 COGNITIVE COMPLAINTS: ICD-10-CM

## 2024-10-02 DIAGNOSIS — G24.3 CERVICAL DYSTONIA: ICD-10-CM

## 2024-10-02 DIAGNOSIS — R53.83 FATIGUE, UNSPECIFIED TYPE: ICD-10-CM

## 2024-10-02 DIAGNOSIS — F32.0 DEPRESSION, MAJOR, SINGLE EPISODE, MILD (CMS-HCC): Primary | ICD-10-CM

## 2024-10-02 PROCEDURE — 90837 PSYTX W PT 60 MINUTES: CPT | Mod: AH

## 2024-10-02 PROCEDURE — 90837 PSYTX W PT 60 MINUTES: CPT

## 2024-10-02 NOTE — PROGRESS NOTES
The patient received equipment and instructions for use of the [facility] Nomad HSAT device. The patient was instructed how to apply the effort belts, cannula, thermistor. It was also explained how the Nomad and oximeter components work.  The patient was asked to record their sleep for a  6 to 8 hour period.     The patient was informed of their responsibility for the device and acknowledged this by signing the HSAT device contract. The patient was asked to return the device on 10- between the hours of 0830 and 1530 to the Sleep Center.     The patient was instructed to call 911 as usual for any medical- emergencies while at home.  The patient was also given a phone number for troubleshooting when using the device in case there were additional questions.

## 2024-10-02 NOTE — PROGRESS NOTES
NEUROPSYCHOLOGICAL REHABILITATION NOTE    Name: Yadiel Osborne   MRN: [id number]   Age: 29 y.o.   Date of Service: 10/2/2024  Reason For Visit: Neuropsychological Rehabilitation     Reason For Referral  Mr. Yadiel Osborne is a 29 y.o. male who was referred by Dr. Patterson for neuropsychological rehabilitation due to depression/anxiety, as well as history of cervical dystonia.     Goals:   1) Develop strategies to manage depression/anxiety   2) Develop adaptive responses to negative self talk    Session Details:   He arrived on time and unaccompanied.     The agenda for the session involved completing a check-in, reviewing home practice, and discussing comfort. During the check-in, he reported being more restless at work because work is repetitive. 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 We discussed the pros/cons of each solution. The home practice was reviewed to focus on the noticing self (not completed), continue with the concept of workability (not completed), and read chapters 14-16 (completed). The remainder of the session focused on comfort. He indicated being afraid of being uncomfortable, but also having all or nothing thinking when it comes to being uncomfortable. The session ended with us discussing how to practice being uncomfortable, and a list of possible options was provided.    Diagnosis:   1. Depression, major, single episode, mild (CMS-HCC)    2. Cognitive complaints    3. Cervical dystonia    4. Fatigue, unspecified type    5. Generalized anxiety disorder      Plan:   The next in-person session is on 10/16 at 3PM. For home practice, he will focus on challenging himself to be uncomfortable and choosing one defusion exercise to do each day. The plan for next session will be to complete a check-in, review home practice, and discuss the concept of acceptance.      Time:   0441-4447

## 2024-10-16 ENCOUNTER — OFFICE VISIT (OUTPATIENT)
Dept: PSYCHOLOGY | Facility: CLINIC | Age: 29
End: 2024-10-16
Payer: COMMERCIAL

## 2024-10-16 DIAGNOSIS — R53.83 FATIGUE, UNSPECIFIED TYPE: ICD-10-CM

## 2024-10-16 DIAGNOSIS — F32.0 DEPRESSION, MAJOR, SINGLE EPISODE, MILD (CMS-HCC): Primary | ICD-10-CM

## 2024-10-16 DIAGNOSIS — F41.1 GENERALIZED ANXIETY DISORDER: ICD-10-CM

## 2024-10-16 DIAGNOSIS — R41.9 COGNITIVE COMPLAINTS: ICD-10-CM

## 2024-10-16 DIAGNOSIS — G24.3 CERVICAL DYSTONIA: ICD-10-CM

## 2024-10-16 PROCEDURE — 90837 PSYTX W PT 60 MINUTES: CPT

## 2024-10-16 PROCEDURE — 90837 PSYTX W PT 60 MINUTES: CPT | Mod: AH

## 2024-10-23 NOTE — PROGRESS NOTES
"[facility]  Followup Visit Note        HISTORY OF PRESENT ILLNESS   Yadiel Osborne is a 29 y.o. male who presents to a [facility] for followup.       Current History    Sleep study was consistent with mild severity sleep apnea with AHI of 7.  History of to below 88% for 0 minutes.  SpO2 alen was 89%.    He is open to CPAP therapy for his sleepiness and fatigue symptoms.    Previous visit 04/10/24  OBSTRUCTIVE SLEEP APNEA / SLEEPINESS/ FATIGUE  -Ordering sleep study  -Discussed symptoms and risks of untreated sleep apnea  -Recommend trying lexapro at night    BMI>26  -Body mass index is 26.63 kg/m².  today  -May benefit from wt loss in terms of sleep apnea    Followup 3 weeks after sleep study to review results       Sleep Scales:  ESS: 9     REVIEW OF SYSTEMS    All other systems negative      PHYSICAL EXAM     VITAL SIGNS: /75   Pulse 69   Ht 1.702 m (5' 7\")   Wt 76.2 kg (168 lb)   SpO2 98%   BMI 26.31 kg/m²      PREVIOUS WEIGHTS:  Wt Readings from Last 3 Encounters:   10/24/24 76.2 kg (168 lb)   10/02/24 78 kg (171 lb 15.3 oz)   09/12/24 75.8 kg (167 lb)       Constitutional: Alert and oriented, cooperative, no obvious distress   HEENT: Non icteric or anemic, EOM WNL bilaterally   Neck: Supple, no JVD, no goiter, no adenopathy, no rigidity  Extremities: No clubbing, no LL edema   Neuromuscular: Cranial nerves grossly intact, no focal deficits     RESULTS/DATA     No results found for: \"IRON\", \"TRANSFERRIN\", \"IRONSAT\", \"TIBC\", \"FERRITIN\"      ASSESSMENT/PLAN     Mr. Osborne is a 29 y.o. male and returns in followup for the following issues:    OBSTRUCTIVE SLEEP APNEA / SLEEPINESS / FATIGUE  -Reviewed test results with patient  -Will order new CPAP from [facility] with pressure 5-15 cm H2O  -Discussed mask options and common tolerance issues  -Goal of CPAP includes less daytime sleepiness and fatigue    Followup 31-90 days after starting CPAP yeah you " can do for

## 2024-10-24 ENCOUNTER — OFFICE VISIT (OUTPATIENT)
Dept: SLEEP MEDICINE | Facility: CLINIC | Age: 29
End: 2024-10-24
Payer: COMMERCIAL

## 2024-10-24 ENCOUNTER — APPOINTMENT (OUTPATIENT)
Dept: SLEEP MEDICINE | Facility: CLINIC | Age: 29
End: 2024-10-24
Payer: COMMERCIAL

## 2024-10-24 VITALS
OXYGEN SATURATION: 98 % | DIASTOLIC BLOOD PRESSURE: 75 MMHG | SYSTOLIC BLOOD PRESSURE: 123 MMHG | BODY MASS INDEX: 26.37 KG/M2 | WEIGHT: 168 LBS | HEIGHT: 67 IN | HEART RATE: 69 BPM

## 2024-10-24 DIAGNOSIS — G47.19 EXCESSIVE DAYTIME SLEEPINESS: ICD-10-CM

## 2024-10-24 DIAGNOSIS — G47.33 OBSTRUCTIVE SLEEP APNEA SYNDROME: Primary | ICD-10-CM

## 2024-10-24 PROCEDURE — 99213 OFFICE O/P EST LOW 20 MIN: CPT | Performed by: PHYSICIAN ASSISTANT

## 2024-10-24 PROCEDURE — 1036F TOBACCO NON-USER: CPT | Performed by: PHYSICIAN ASSISTANT

## 2024-10-24 PROCEDURE — 3008F BODY MASS INDEX DOCD: CPT | Performed by: PHYSICIAN ASSISTANT

## 2024-10-24 ASSESSMENT — LIFESTYLE VARIABLES
AUDIT-C TOTAL SCORE: 2
HOW MANY STANDARD DRINKS CONTAINING ALCOHOL DO YOU HAVE ON A TYPICAL DAY: 1 OR 2
HOW OFTEN DO YOU HAVE SIX OR MORE DRINKS ON ONE OCCASION: NEVER
HOW OFTEN DO YOU HAVE A DRINK CONTAINING ALCOHOL: 2-4 TIMES A MONTH
SKIP TO QUESTIONS 9-10: 1

## 2024-10-24 ASSESSMENT — SLEEP AND FATIGUE QUESTIONNAIRES
HOW LIKELY ARE YOU TO NOD OFF OR FALL ASLEEP WHILE SITTING AND READING: SLIGHT CHANCE OF DOZING
HOW LIKELY ARE YOU TO NOD OFF OR FALL ASLEEP WHILE SITTING QUIETLY AFTER LUNCH WITHOUT ALCOHOL: SLIGHT CHANCE OF DOZING
ESS-CHAD TOTAL SCORE: 9
HOW LIKELY ARE YOU TO NOD OFF OR FALL ASLEEP WHILE WATCHING TV: SLIGHT CHANCE OF DOZING
HOW LIKELY ARE YOU TO NOD OFF OR FALL ASLEEP IN A CAR, WHILE STOPPED FOR A FEW MINUTES IN TRAFFIC: WOULD NEVER DOZE
HOW LIKELY ARE YOU TO NOD OFF OR FALL ASLEEP WHEN YOU ARE A PASSENGER IN A CAR FOR AN HOUR WITHOUT A BREAK: MODERATE CHANCE OF DOZING
HOW LIKELY ARE YOU TO NOD OFF OR FALL ASLEEP WHILE LYING DOWN TO REST IN THE AFTERNOON WHEN CIRCUMSTANCES PERMIT: SLIGHT CHANCE OF DOZING
HOW LIKELY ARE YOU TO NOD OFF OR FALL ASLEEP WHILE SITTING AND TALKING TO SOMEONE: SLIGHT CHANCE OF DOZING
SITING INACTIVE IN A PUBLIC PLACE LIKE A CLASS ROOM OR A MOVIE THEATER: MODERATE CHANCE OF DOZING

## 2024-10-24 ASSESSMENT — PATIENT HEALTH QUESTIONNAIRE - PHQ9
1. LITTLE INTEREST OR PLEASURE IN DOING THINGS: SEVERAL DAYS
10. IF YOU CHECKED OFF ANY PROBLEMS, HOW DIFFICULT HAVE THESE PROBLEMS MADE IT FOR YOU TO DO YOUR WORK, TAKE CARE OF THINGS AT HOME, OR GET ALONG WITH OTHER PEOPLE: SOMEWHAT DIFFICULT
SUM OF ALL RESPONSES TO PHQ9 QUESTIONS 1 & 2: 2
2. FEELING DOWN, DEPRESSED OR HOPELESS: SEVERAL DAYS

## 2024-10-24 ASSESSMENT — PAIN SCALES - GENERAL: PAINLEVEL_OUTOF10: 2

## 2024-10-24 NOTE — PATIENT INSTRUCTIONS
Good seeing you today,    Order sent to [facility] with auto pressure 5-15 cm H2O. If you feel uncomfortable with pressure settings let me know and I can change them online.    Some mask options I recommend    Resmed N30i nasal mask (tube on top of head) - good option if you toss and turn a lot  Resmed N30 nasal mask (tube in front)  Donovan brisa fx nasal pillow mask (tube in front)  Resmed P10 nasal pillow mask (tube in front)  Resmed P30i nasal mask (tube on top of head) - good option if you toss and turn a lot    You can change humidity settings based on comfort    We will plan on seeing you back in 31-90 days for a required compliance appointment. Try to use at least 4 hours per night for >70% of nights.    Cleveland Sheffield PA-C    IMPORTANT PHONE NUMBERS     [id number] ([id number])  [employer] ([id number]): [phone]  For clinical questions and refilling prescriptions: [phone]  Brynn Carmichael (For Angelika/Nettie): P: [phone]

## 2024-10-30 ENCOUNTER — OFFICE VISIT (OUTPATIENT)
Dept: PSYCHOLOGY | Facility: CLINIC | Age: 29
End: 2024-10-30
Payer: COMMERCIAL

## 2024-10-30 DIAGNOSIS — F41.1 GENERALIZED ANXIETY DISORDER: ICD-10-CM

## 2024-10-30 DIAGNOSIS — G24.3 CERVICAL DYSTONIA: ICD-10-CM

## 2024-10-30 DIAGNOSIS — R53.83 FATIGUE, UNSPECIFIED TYPE: ICD-10-CM

## 2024-10-30 DIAGNOSIS — R41.9 COGNITIVE COMPLAINTS: ICD-10-CM

## 2024-10-30 DIAGNOSIS — F32.0 DEPRESSION, MAJOR, SINGLE EPISODE, MILD (CMS-HCC): Primary | ICD-10-CM

## 2024-10-30 PROCEDURE — 90837 PSYTX W PT 60 MINUTES: CPT | Mod: AH

## 2024-10-30 PROCEDURE — 90837 PSYTX W PT 60 MINUTES: CPT

## 2024-11-04 ENCOUNTER — APPOINTMENT (OUTPATIENT)
Dept: PRIMARY CARE | Facility: CLINIC | Age: 29
End: 2024-11-04
Payer: COMMERCIAL

## 2024-11-04 DIAGNOSIS — G24.1: Primary | ICD-10-CM

## 2024-11-04 DIAGNOSIS — R10.2 PELVIC PAIN IN MALE: ICD-10-CM

## 2024-11-04 PROCEDURE — 99212 OFFICE O/P EST SF 10 MIN: CPT | Performed by: FAMILY MEDICINE

## 2024-11-04 NOTE — PROGRESS NOTES
Subjective   Patient ID: Yadiel Osborne is a 29 y.o. male who presents for dystonia (Needs work accomodation.).  Patient has long history of dystonia with symptoms starting early in life.  It is being treated, but he still has functional limitations, mostly due to the exhaustion that comes with the disorder.  Is seeing neurology and neuropsych.  Working at  [employer].  Likes the job, wants to do well there.  Is a pretty physical job, and he is able to do the work, in general.  However some days, when he is dealing with exhaustion, he may need to come in late, or miss work altogether.  He is currently working 4 days a week, and that is good for him.    He also would like a referral to urologist for discomfort in the pelvic/genital area.        Review of Systems    Objective   There were no vitals taken for this visit.    Physical Exam  Constitutional:       General: He is not in acute distress.     Appearance: Normal appearance. He is not ill-appearing.   Neurological:      Mental Status: He is alert and oriented to person, place, and time.   Psychiatric:         Mood and Affect: Mood normal.         Behavior: Behavior normal.           Assessment/Plan   Problem List Items Addressed This Visit       DYT1 dystonia - Primary     Other Visit Diagnoses       Pelvic pain in male        Relevant Orders    Referral to Urology

## 2024-11-04 NOTE — PATIENT INSTRUCTIONS
Due to dystonia, exhaustion may occur that impacts ability to function at the job.  ADA form completed, and will be faxed to  Matt    For ongoing pelvic discomfort, refer to urology.

## 2024-11-07 ENCOUNTER — TELEPHONE (OUTPATIENT)
Dept: NEUROLOGY | Facility: CLINIC | Age: 29
End: 2024-11-07
Payer: COMMERCIAL

## 2024-11-07 NOTE — TELEPHONE ENCOUNTER
Yadiel called. Today he is calling with several questions . He has high anxiety during our call. He would like to know what should he expect from his DBS what level of resolve should he expect?      He feels that his movements are not smooth, he is limping and having more freezing. He states he feels off physically and emotionally.   He states that when he has anxiety, trouble concentrating or is stressed his Dystonia is worse as well as all his symptoms. Can he adjust his DBS to help resolve these issues when he is anxious ?     What symptoms and how good should he expect the DBS to resolve his dystonia symptoms?    He wants an expedited appt but not with ANS.

## 2024-11-13 ENCOUNTER — OFFICE VISIT (OUTPATIENT)
Dept: PSYCHOLOGY | Facility: CLINIC | Age: 29
End: 2024-11-13
Payer: COMMERCIAL

## 2024-11-13 DIAGNOSIS — G24.3 CERVICAL DYSTONIA: ICD-10-CM

## 2024-11-13 DIAGNOSIS — R53.83 FATIGUE, UNSPECIFIED TYPE: ICD-10-CM

## 2024-11-13 DIAGNOSIS — F32.0 DEPRESSION, MAJOR, SINGLE EPISODE, MILD (CMS-HCC): Primary | ICD-10-CM

## 2024-11-13 DIAGNOSIS — F41.1 GENERALIZED ANXIETY DISORDER: ICD-10-CM

## 2024-11-13 DIAGNOSIS — R41.9 COGNITIVE COMPLAINTS: ICD-10-CM

## 2024-11-13 PROCEDURE — 90837 PSYTX W PT 60 MINUTES: CPT | Mod: AH

## 2024-11-13 PROCEDURE — 90837 PSYTX W PT 60 MINUTES: CPT

## 2024-11-13 NOTE — PROGRESS NOTES
"NEUROPSYCHOLOGICAL REHABILITATION NOTE    Name: Yadiel Osborne   MRN: [id number]   Age: 29 y.o.   Date of Service: 11/13/2024  Reason For Visit: Neuropsychological Rehabilitation     Reason For Referral  Mr. Yadiel Osborne is a 29 y.o. male who was referred by Dr. Patterson for neuropsychological rehabilitation due to depression/anxiety, as well as history of cervical dystonia.     Goals:   1) Develop strategies to manage depression/anxiety   2) Develop adaptive responses to negative self talk    Session Details:   He arrived on time and unaccompanied.     The agenda for the session involved completing a check-in, reviewing home practice, discussing adjustment to disability, and reviewing ACT based skills. During the check-in, he reported scheduling to meet with his neurologist to discuss DBS settings and increasing his Lexapro dosage. He also expressed worry about transitioning his medical care to [facility]. The home practice was reviewed to implementing committed action and using unhooking skills. He continues to struggle implementing skills because he is \"tired\" and prefers to distract himself with technology. The majority of the session focused on adjustment to disability concerns. He continues to express desire to be \"normal,\" and we discussed how this desire likely is resulting in unrealistic expectations which was processed. The session ended with us reviewing ACT based skills, such as defusion, being present, taking a deep breath, and acting in accordance with values. He indicated having resources available when/if he decides to implement these skills.     Diagnosis:   1. Depression, major, single episode, mild (CMS-HCC)    2. Cognitive complaints    3. Cervical dystonia    4. Fatigue, unspecified type    5. Generalized anxiety disorder      Plan:   Treatment is being discontinued because we completed the ACT protocol. He will continue to follow-up with his mental health therapist for supportive therapy. He was " informed to contact me if he would like to re-establish care. He verbalized his understanding.     Time:   0524-8498

## 2024-11-14 ENCOUNTER — APPOINTMENT (OUTPATIENT)
Dept: UROLOGY | Facility: CLINIC | Age: 29
End: 2024-11-14
Payer: COMMERCIAL

## 2024-11-21 ENCOUNTER — PROCEDURE VISIT (OUTPATIENT)
Dept: NEUROLOGY | Facility: HOSPITAL | Age: 29
End: 2024-11-21
Payer: COMMERCIAL

## 2024-11-21 ENCOUNTER — APPOINTMENT (OUTPATIENT)
Dept: UROLOGY | Facility: CLINIC | Age: 29
End: 2024-11-21
Payer: COMMERCIAL

## 2024-11-21 VITALS
HEART RATE: 61 BPM | BODY MASS INDEX: 26.84 KG/M2 | WEIGHT: 171 LBS | SYSTOLIC BLOOD PRESSURE: 122 MMHG | HEIGHT: 67 IN | DIASTOLIC BLOOD PRESSURE: 71 MMHG

## 2024-11-21 DIAGNOSIS — G24.1: Primary | ICD-10-CM

## 2024-11-21 PROCEDURE — 95983 ALYS BRN NPGT PRGRMG 15 MIN: CPT | Performed by: PSYCHIATRY & NEUROLOGY

## 2024-11-21 PROCEDURE — 99213 OFFICE O/P EST LOW 20 MIN: CPT | Mod: GC | Performed by: PSYCHIATRY & NEUROLOGY

## 2024-11-21 PROCEDURE — 99213 OFFICE O/P EST LOW 20 MIN: CPT | Performed by: PSYCHIATRY & NEUROLOGY

## 2024-11-21 PROCEDURE — 95984 ALYS BRN NPGT PRGRMG ADDL 15: CPT | Performed by: PSYCHIATRY & NEUROLOGY

## 2024-11-21 RX ORDER — PREDNISONE 20 MG/1
20 TABLET ORAL DAILY
COMMUNITY

## 2024-11-21 NOTE — PATIENT INSTRUCTIONS
"We did some programming for both sides of your body   L side:   - We created a new group called \"Thanksgiv\" and you are going home on it today. You have room to go down if needed.  - You still have group \"Baseline\" to return to if needed    R side:  - We increased by only 0.2V because we noticed a very slight sign of dystonia. Again you have room to go down if needed or you can switch back to your original \"baseline\" group    Come back to see us for follow up on 01/16/2025 but message us with any new question or concern.  "

## 2024-11-21 NOTE — LETTER
"November 22, 2024     Cathi Kiran MD  [id number]  [address]  [facility] [id number]    Patient: Yadiel Osborne   YOB: 1995   Date of Visit: 11/21/2024       Dear Dr. Cathi Kiran MD:    Thank you for referring aYdiel Osborne to me for evaluation. Below are my notes for this consultation.  If you have questions, please do not hesitate to call me. I look forward to following your patient along with you.       Sincerely,     Arabella Posadas MD      CC: No Recipients  ______________________________________________________________________________________    Subjective     Yadiel Osborne is a 29 y.o. year old male who presents with No chief complaint on file., here for follow up visit.    HPI    Last visit with me 9/12/24 and DBS adjusted for dystonia, mostly felt in LLE when walking. Sent home on group D but did not tolerate (freezing of motion and thoughts) so went back to group B \"baseline\".    Still feels like the left leg is a little off, he gets a feeling that his leg is froze when he has to make quick moves .  Also reports some uncomfortable sensation in his voice/throat. Would like to start out as he is planning a trip to [facility] at the end of January.    No falls.    R chest IPG feels it is poking his skin a little bit, denies redness, swelling or pain, is more aware of it.     NP testing with Dr. Patterson 5/9/24  Taken together, his profile appears to reflect contributions from depression, stress, anxiety, unrestful sleep, and fatigue. That is, his cognitive and memory capabilities are well preserved, but in everyday situations transient lapses in cognitive efficiency due to stress/mood affect the ease of retrieving information, including words/vocabulary.  We are confident that more targeted management of stress, anxiety, depression, and sleep disruption will substantially improve Mr. Osborne' cognitive functioning in everyday activities and improve overall quality of life.       These " "findings are consistent with complaints of memory difficulty (ICD R41.3), psychomotor slowing/deficits (ICD R41.843), and executive deficits (ICD R41.844), consistent with dystonia (G24.9) with exacerbation from depression (ICD F32.A), stress (ICD F43.8), anxiety (ICD F41.9), and sleep disturbances (ICD G47.9).      He is doing NP rehab for dep/anx, working with psych NP. Not sure helpful. On Lexapro-not sure helpful.         Current Outpatient Medications:   •  albuterol (Ventolin HFA) 90 mcg/actuation inhaler, Inhale 2 puffs every 4 hours if needed for wheezing or shortness of breath., Disp: 8 g, Rfl: 1  •  APPLE CIDER VINEGAR ORAL, Take 1 capsule by mouth once daily., Disp: , Rfl:   •  cholecalciferol (Vitamin D-3) 125 mcg (5000 UT) capsule, Take 1 capsule (125 mcg) by mouth once daily., Disp: , Rfl:   •  escitalopram (Lexapro) 5 mg tablet, Take 1 tablet (5 mg) by mouth once daily., Disp: , Rfl:   •  MAGNESIUM GLYCINATE ORAL, Take 1 capsule by mouth if needed., Disp: , Rfl:   •  NON FORMULARY, Dried beef organs 3 capsules daily, Disp: , Rfl:        Objective   There were no vitals filed for this visit.       Physical Exam  Mild L leg and arm dystonia with reduced arm swing   Very minimal R foot dystonia  No bradykinesia noted in any extremity   Strength 5/5 diffusely   Mild dystonic gait     R chest DBS IPG if he pushes on it the corner will be right under his skin but no pain, redness, swelling or open areas       Procedure note:  Bilateral DBS rechargeable IPGs-- hour per side once a week    L chest IPG, L GPi  Activa RC: Nov 2021  JOHANNA: 11/2035  Battery 100%,   Th impedance:722 ohms 3.8mA--unchanged  Electrode impedances  wnl    Group B \"baseline\"   L GPI:C+ 2-/2.8V/120us/60Hz    Programming - just increased the settings by 0.2V, has range to go down.   Created new group called \"Thanksgiv\" (11/21/24)  L GPI:C+ 2-/3V/120us/60Hz    Final setting   Created new group called \"Thanksgiv\" (11/21/24)  L GPI:C+ " "2-/3V/120us/60Hz (0.0-3.0)    R chest IPG, R GPi  Activa RC:   JOHANNA: 11/2035  Battery: 100%  Electrode impedances OK  Th impedances: 557ohms/4.9mA--unchanged     Initial settings:  Group B \"Baseline\":  C+1-2-/2.8V/120Us/70Hz    Programming    Tried 0+1-2- but caused visual symptoms at low voltage    Made new group A \"Thanksgiv\"  3+1-2-/4.2V/120Us/70Hz     Still has group B \"baseline\" he came in with    Final settings      Group A \"Thanksgiv\"  3+1-2-/4.2V/120Us/70Hz (0.0-4.2)       ---------------------Previous Programming---------------------------  2/2024  Group B Increasing freq 115 to SE \"lack of control\" on L side , increasing causing vision changes (white noise on a TV/artifact)--lowered PW to 110 and head still felt weird, 90 which was tolerated made this group C, kept B same settings    Higher PW in B makes his head feel weird.     Monopolar review  R GPi (L body)  C+0- SE 1.2--vision changes, speckles   C+1-SE 4.5 L cheek feeling like it wants to pull  C+2-SE 4.5 trouble speaking  C+3- no SE at 5.0     PLAN  Next visit can try C+1- if needed.      Final settings    Group C--same as B but with slightly higher freq 2/8/24  C+1-2- 2.8V/120Us/90Hz    Group B--settings made last visit  1/2/24  C+1-2-/2.8V/120Us/70Hz    Group D--new settings 2/8/24  C+2-3- 2.5/120/70     Group A:   C+2-/3.3V/120us/70Hz      Assessment/Plan   Mr. Yadiel Osborne is a 29 y.o. m here for follow up of bilateral Gpi DBS for his DYT1 generalized dystonia, initially implanted in [place] in 2008. He has had many years of excellent symptom control. He has exacerbation of LLE dystonia (he feels his gait is different/less fluid, sometimes other notice), however, not noted on exam. This is worsened by stress and anxiety and can talk himself out of it at times, likely more related to anxiety. He has good insight, is working with psychology and had NP testing for cognitive complaints thought to be d/t mood and sleep issues. Pending sleep study for " "fatigue.     DBS adjusted today, did discuss since he is currently on settings that provide great control, that he may have worsening on new settings though he wants to try and can always go back to previous settings.  Also discussed w patient that induction of hypokinesia can occur w Bilateral Gpi DBS for dystonia, and can be challenging to control, w optimal dystonia control. It is likely that the sensation of his leg getting stuck w sudden movements, could be a mild form of FOG that can be induced by pallidal stimulation, therefor we decided to make a new group, w bipolar stimulation, trying to narrow the field to see if this is beneficial.     DBS adjusted today. Went home on group A \"Thanksgiv\" as above. Mostly programmed L hemibody but also increased right by 0.2V. Discussed need to try it out for a few weeks as long as it is not causing significant interference with the way he feels and daily activities. He is planning a trip to [facility] at end of January.      I saw and evaluated the patient. I personally obtained the key and critical portions of the history and physical exam or was physically present for key and critical portions performed by the resident/fellow. I reviewed the resident/fellow's documentation and discussed the patient with the resident/fellow. I agree with the resident/fellow's medical decision making as documented in the note. I was present for the entire DBS programming, we spent in total 45 minutes performing DBS programming.       Arabella Posadas MD         For the Evaluation and Management of this patient, the level of Medical Decision Making for this visit was determined based on the following:    The level of COMPLEXITY AND NUMBER OF PROBLEMS ADDRESSED was [MODERATE,] as determined by:     MODERATE:    one chronic illnesses with exacerbation, progression or side effects        The AMOUNT/COMPLEXITY OF DATA TO REVIEW (reviewed, ordered or call for) was LIMITED] as determined " by:    LIMITED:  my review of prior external notes from a unique source.      The level of RISK OF COMPLICATIONS was [ LOW] as determined by:    LOW:  A low risk of morbidity from additional diagnostic testing or treatment.      Thus, the level of medical decision making (based on the lower of the two highest elements) was determined to be  LOW]. Therefore the appropriate E/M code for this encounter is 92582.

## 2024-11-21 NOTE — PROGRESS NOTES
"Subjective     Yadiel MATTI Osborne is a 29 y.o. year old male who presents with No chief complaint on file., here for follow up visit.    HPI    Last visit with me 9/12/24 and DBS adjusted for dystonia, mostly felt in LLE when walking. Sent home on group D but did not tolerate (freezing of motion and thoughts) so went back to group B \"baseline\".    Still feels like the left leg is a little off, he gets a feeling that his leg is froze when he has to make quick moves .  Also reports some uncomfortable sensation in his voice/throat. Would like to start out as he is planning a trip to [facility] at the end of January.    No falls.    R chest IPG feels it is poking his skin a little bit, denies redness, swelling or pain, is more aware of it.     NP testing with Dr. Patterson 5/9/24  Taken together, his profile appears to reflect contributions from depression, stress, anxiety, unrestful sleep, and fatigue. That is, his cognitive and memory capabilities are well preserved, but in everyday situations transient lapses in cognitive efficiency due to stress/mood affect the ease of retrieving information, including words/vocabulary.  We are confident that more targeted management of stress, anxiety, depression, and sleep disruption will substantially improve Mr. Osborne' cognitive functioning in everyday activities and improve overall quality of life.       These findings are consistent with complaints of memory difficulty (ICD R41.3), psychomotor slowing/deficits (ICD R41.843), and executive deficits (ICD R41.844), consistent with dystonia (G24.9) with exacerbation from depression (ICD F32.A), stress (ICD F43.8), anxiety (ICD F41.9), and sleep disturbances (ICD G47.9).      He is doing NP rehab for dep/anx, working with psych NP. Not sure helpful. On Lexapro-not sure helpful.         Current Outpatient Medications:     albuterol (Ventolin HFA) 90 mcg/actuation inhaler, Inhale 2 puffs every 4 hours if needed for wheezing or " "shortness of breath., Disp: 8 g, Rfl: 1    APPLE CIDER VINEGAR ORAL, Take 1 capsule by mouth once daily., Disp: , Rfl:     cholecalciferol (Vitamin D-3) 125 mcg (5000 UT) capsule, Take 1 capsule (125 mcg) by mouth once daily., Disp: , Rfl:     escitalopram (Lexapro) 5 mg tablet, Take 1 tablet (5 mg) by mouth once daily., Disp: , Rfl:     MAGNESIUM GLYCINATE ORAL, Take 1 capsule by mouth if needed., Disp: , Rfl:     NON FORMULARY, Dried beef organs 3 capsules daily, Disp: , Rfl:        Objective   There were no vitals filed for this visit.       Physical Exam  Mild L leg and arm dystonia with reduced arm swing   Very minimal R foot dystonia  No bradykinesia noted in any extremity   Strength 5/5 diffusely   Mild dystonic gait     R chest DBS IPG if he pushes on it the corner will be right under his skin but no pain, redness, swelling or open areas       Procedure note:  Bilateral DBS rechargeable IPGs-- hour per side once a week    L chest IPG, L GPi  Activa RC: Nov 2021  JOHANNA: 11/2035  Battery 100%,   Th impedance:722 ohms 3.8mA--unchanged  Electrode impedances  wnl    Group B \"baseline\"   L GPI:C+ 2-/2.8V/120us/60Hz    Programming - just increased the settings by 0.2V, has range to go down.   Created new group called \"Thanksgiv\" (11/21/24)  L GPI:C+ 2-/3V/120us/60Hz    Final setting   Created new group called \"Thanksgiv\" (11/21/24)  L GPI:C+ 2-/3V/120us/60Hz (0.0-3.0)    R chest IPG, R GPi  Activa RC:   JOHANNA: 11/2035  Battery: 100%  Electrode impedances OK  Th impedances: 557ohms/4.9mA--unchanged     Initial settings:  Group B \"Baseline\":  C+1-2-/2.8V/120Us/70Hz    Programming    Tried 0+1-2- but caused visual symptoms at low voltage    Made new group A \"Thanksgiv\"  3+1-2-/4.2V/120Us/70Hz     Still has group B \"baseline\" he came in with    Final settings      Group A \"Thanksgiv\"  3+1-2-/4.2V/120Us/70Hz (0.0-4.2)       ---------------------Previous Programming---------------------------  2/2024  Group B Increasing freq " "115 to SE \"lack of control\" on L side , increasing causing vision changes (white noise on a TV/artifact)--lowered PW to 110 and head still felt weird, 90 which was tolerated made this group C, kept B same settings    Higher PW in B makes his head feel weird.     Monopolar review  R GPi (L body)  C+0- SE 1.2--vision changes, speckles   C+1-SE 4.5 L cheek feeling like it wants to pull  C+2-SE 4.5 trouble speaking  C+3- no SE at 5.0     PLAN  Next visit can try C+1- if needed.      Final settings    Group C--same as B but with slightly higher freq 2/8/24  C+1-2- 2.8V/120Us/90Hz    Group B--settings made last visit  1/2/24  C+1-2-/2.8V/120Us/70Hz    Group D--new settings 2/8/24  C+2-3- 2.5/120/70     Group A:   C+2-/3.3V/120us/70Hz      Assessment/Plan   Mr. Yadiel Osborne is a 29 y.o. m here for follow up of bilateral Gpi DBS for his DYT1 generalized dystonia, initially implanted in [place] in 2008. He has had many years of excellent symptom control. He has exacerbation of LLE dystonia (he feels his gait is different/less fluid, sometimes other notice), however, not noted on exam. This is worsened by stress and anxiety and can talk himself out of it at times, likely more related to anxiety. He has good insight, is working with psychology and had NP testing for cognitive complaints thought to be d/t mood and sleep issues. Pending sleep study for fatigue.     DBS adjusted today, did discuss since he is currently on settings that provide great control, that he may have worsening on new settings though he wants to try and can always go back to previous settings.  Also discussed w patient that induction of hypokinesia can occur w Bilateral Gpi DBS for dystonia, and can be challenging to control, w optimal dystonia control. It is likely that the sensation of his leg getting stuck w sudden movements, could be a mild form of FOG that can be induced by pallidal stimulation, therefor we decided to make a new group, w bipolar " "stimulation, trying to narrow the field to see if this is beneficial.     DBS adjusted today. Went home on group A \"Thanksgiv\" as above. Mostly programmed L hemibody but also increased right by 0.2V. Discussed need to try it out for a few weeks as long as it is not causing significant interference with the way he feels and daily activities. He is planning a trip to [facility] at end of January.      I saw and evaluated the patient. I personally obtained the key and critical portions of the history and physical exam or was physically present for key and critical portions performed by the resident/fellow. I reviewed the resident/fellow's documentation and discussed the patient with the resident/fellow. I agree with the resident/fellow's medical decision making as documented in the note. I was present for the entire DBS programming, we spent in total 45 minutes performing DBS programming.       Arabella Posadas MD         For the Evaluation and Management of this patient, the level of Medical Decision Making for this visit was determined based on the following:    The level of COMPLEXITY AND NUMBER OF PROBLEMS ADDRESSED was [MODERATE,] as determined by:     MODERATE:    one chronic illnesses with exacerbation, progression or side effects        The AMOUNT/COMPLEXITY OF DATA TO REVIEW (reviewed, ordered or call for) was LIMITED] as determined by:    LIMITED:  my review of prior external notes from a unique source.      The level of RISK OF COMPLICATIONS was [ LOW] as determined by:    LOW:  A low risk of morbidity from additional diagnostic testing or treatment.      Thus, the level of medical decision making (based on the lower of the two highest elements) was determined to be  LOW]. Therefore the appropriate E/M code for this encounter is 06055.    "

## 2024-11-25 ENCOUNTER — TELEPHONE (OUTPATIENT)
Dept: NEUROSURGERY | Facility: HOSPITAL | Age: 29
End: 2024-11-25
Payer: COMMERCIAL

## 2024-11-25 NOTE — TELEPHONE ENCOUNTER
----- Message from Johanny Adams sent at 11/21/2024  4:33 PM EST -----  Regarding: Reschedule visit  Quinten Casanova,    This patient scheduled himself with me through [employer]. Can you please reschedule him with Dr. Segura or Dr. Alexander to discuss his DBS generator discomfort?    Thanks,    Johanny

## 2024-11-26 ENCOUNTER — APPOINTMENT (OUTPATIENT)
Dept: NEUROSURGERY | Facility: HOSPITAL | Age: 29
End: 2024-11-26
Payer: COMMERCIAL

## 2024-12-03 ENCOUNTER — APPOINTMENT (OUTPATIENT)
Dept: NEUROLOGY | Facility: CLINIC | Age: 29
End: 2024-12-03
Payer: COMMERCIAL

## 2024-12-03 VITALS
BODY MASS INDEX: 26.63 KG/M2 | SYSTOLIC BLOOD PRESSURE: 105 MMHG | HEART RATE: 75 BPM | DIASTOLIC BLOOD PRESSURE: 65 MMHG | WEIGHT: 170 LBS | RESPIRATION RATE: 16 BRPM

## 2024-12-03 DIAGNOSIS — G24.1: Primary | ICD-10-CM

## 2024-12-03 DIAGNOSIS — F41.9 ANXIETY: ICD-10-CM

## 2024-12-03 PROCEDURE — 1036F TOBACCO NON-USER: CPT | Performed by: NURSE PRACTITIONER

## 2024-12-03 PROCEDURE — 95970 ALYS NPGT W/O PRGRMG: CPT | Performed by: NURSE PRACTITIONER

## 2024-12-03 PROCEDURE — 99214 OFFICE O/P EST MOD 30 MIN: CPT | Performed by: NURSE PRACTITIONER

## 2024-12-03 ASSESSMENT — PATIENT HEALTH QUESTIONNAIRE - PHQ9
6. FEELING BAD ABOUT YOURSELF - OR THAT YOU ARE A FAILURE OR HAVE LET YOURSELF OR YOUR FAMILY DOWN: SEVERAL DAYS
9. THOUGHTS THAT YOU WOULD BE BETTER OFF DEAD, OR OF HURTING YOURSELF: SEVERAL DAYS
SUM OF ALL RESPONSES TO PHQ QUESTIONS 1-9: 13
SUM OF ALL RESPONSES TO PHQ9 QUESTIONS 1 AND 2: 4
3. TROUBLE FALLING OR STAYING ASLEEP OR SLEEPING TOO MUCH: SEVERAL DAYS
10. IF YOU CHECKED OFF ANY PROBLEMS, HOW DIFFICULT HAVE THESE PROBLEMS MADE IT FOR YOU TO DO YOUR WORK, TAKE CARE OF THINGS AT HOME, OR GET ALONG WITH OTHER PEOPLE: SOMEWHAT DIFFICULT
7. TROUBLE CONCENTRATING ON THINGS, SUCH AS READING THE NEWSPAPER OR WATCHING TELEVISION: MORE THAN HALF THE DAYS
5. POOR APPETITE OR OVEREATING: NOT AT ALL
8. MOVING OR SPEAKING SO SLOWLY THAT OTHER PEOPLE COULD HAVE NOTICED. OR THE OPPOSITE, BEING SO FIGETY OR RESTLESS THAT YOU HAVE BEEN MOVING AROUND A LOT MORE THAN USUAL: SEVERAL DAYS
2. FEELING DOWN, DEPRESSED OR HOPELESS: NEARLY EVERY DAY
4. FEELING TIRED OR HAVING LITTLE ENERGY: NEARLY EVERY DAY
1. LITTLE INTEREST OR PLEASURE IN DOING THINGS: SEVERAL DAYS

## 2024-12-03 ASSESSMENT — ENCOUNTER SYMPTOMS
DEPRESSION: 1
LOSS OF SENSATION IN FEET: 0
OCCASIONAL FEELINGS OF UNSTEADINESS: 1

## 2024-12-03 NOTE — PROGRESS NOTES
"Subjective     Yadiel MATTI Osborne is a 29 y.o. year old male who presents with dystonia, here for follow up visit.    HPI    He does not want DBS adjusted, he is not sure what to expect with adjustments and wants to discuss. IPG changed in 2021 and he is focusing on this.   Worried last night about apt he would limp in the waiting room.    Every step he takes he thinks about his walking, and when he changes the settings he is unsure if it feels different or weird or if the settings are right and induces a lot of anxiety.     Worries in certain situations, if he is at a cross walk waiting for light to change or waiting to be called from a waiting room he will limp. This is embarassing and difficult to tolerate and missing out on things like going to graduation bc he does not want to walk across stage.     He cannot tell if he is feeling this way and causing the dystonia to come on or the dystonia is causing the anxiety.     He enjoys hiking but even when no one is around he still focuses on it and tries not to.   He can loosen and relaxes and then builds back up to being tense (psychological tension or physical tension he states) \"like I get myself in a tizzy\". Has to stop when walking with someone, feels off and has to stop and then restart or will get worse and worse.   Ongoing his entire life and became a worse fixation after changing IPG.    One fall in the snow and was slippery and balance is poor. He feel she is not doing well.     Brushing his teeth he notices a slowness in movement and moves his head more than his arms.     Current issues w/ dystonia:  LLE swinging out when walking--he had this issue in middle school-If he is more stressed and anxiety is heightened this is worse and foot shuffles and locks------he was having this prior to dystonia    DBS he put amp to 3.9 bc he did not feel he was moving correctly, might have helped    Shoveled snow and 10-15min and felt like fatigue as worse.     He took some " days off work. Did try going back to baseline group but only for 3 hrs and worsening sx.   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   He says it is exhausting thinking about walking so much and he hates it.   He used to have OCD as a kid. For so long did not tell people about his dystonia and does not at work and people comment at work.  Feels he is spending a lot of energy masking his symptoms and look normal to everyone.     He denies actively feeling suicidal, says he would probably not doing anything  Therapist weekly, says he has family and friends he would reach out to, more of just not wanting to deal with everything.  Psychiatry NP for anxiety meds, seeing a counselor also.    VANNESA, has a CPAP now x 21 days and not helping         Previous Workup  NP testing with Dr. Patterson 5/9/24  Taken together, his profile appears to reflect contributions from depression, stress, anxiety, unrestful sleep, and fatigue. That is, his cognitive and memory capabilities are well preserved, but in everyday situations transient lapses in cognitive efficiency due to stress/mood affect the ease of retrieving information, including words/vocabulary.  We are confident that more targeted management of stress, anxiety, depression, and sleep disruption will substantially improve Mr. Osborne' cognitive functioning in everyday activities and improve overall quality of life.       These findings are consistent with complaints of memory difficulty (ICD R41.3), psychomotor slowing/deficits (ICD R41.843), and executive deficits (ICD R41.844), consistent with dystonia (G24.9) with exacerbation from depression (ICD F32.A), stress (ICD F43.8), anxiety (ICD F41.9), and sleep disturbances (ICD G47.9).     Patient Health Questionnaire-2 Score: 4  Patient Health  "Questionnaire-9 Score: 13         Current Outpatient Medications:     albuterol (Ventolin HFA) 90 mcg/actuation inhaler, Inhale 2 puffs every 4 hours if needed for wheezing or shortness of breath., Disp: 8 g, Rfl: 1    APPLE CIDER VINEGAR ORAL, Take 1 capsule by mouth once daily., Disp: , Rfl:     cholecalciferol (Vitamin D-3) 125 mcg (5000 UT) capsule, Take 1 capsule (125 mcg) by mouth once daily., Disp: , Rfl:     escitalopram (Lexapro) 5 mg tablet, Take 1 tablet (5 mg) by mouth once daily. (Patient taking differently: Take 2 tablets (10 mg) by mouth once daily.), Disp: , Rfl:     MAGNESIUM GLYCINATE ORAL, Take 1 capsule by mouth if needed., Disp: , Rfl:     NON FORMULARY, Dried beef organs 3 capsules daily, Disp: , Rfl:        Objective   Vitals:    12/03/24 0905   BP: 105/65   BP Location: Right arm   Patient Position: Sitting   BP Cuff Size: Large adult   Pulse: 75   Resp: 16   Weight: 77.1 kg (170 lb)       Patient Health Questionnaire-9 Score: 13       Physical Exam  Not able to see dystonia on exam--more so how patient felt when he was walking.     Procedure note:  Bilateral DBS rechargeable IPGs-- hour per side once a week     L chest IPG, L GPi  Activa RC: Nov 2021  JOHANNA: 11/2035  Battery 100%,   Th impedance: 713 ohms 3.8mA--unchanged  Electrode impedances  wnl     Initial/Final settings:  Group B \"baseline\"   L GPi  C+ 2- 2.7V/120us/60Hz     Programming: None    Future programming  Next time can consider increasing PW, he had increased amp but \"felt like too much\" so he lowered it back down.        R chest IPG, R GPi  Activa RC:   JOHANNA: 11/2035  Battery: 75%  Electrode impedances OK  Th impedances: 844ohms/4.6mA--unchanged     Initial settings:  \"Thanksgiv\"  3+1-2-/3.9V/120Us/70Hz      Programming  Group C is Thanksgiv with lower PW     Final settings    Group A \"Thanksgiv\"----active  3+1-2-/9.9V/120Us/70Hz (0.0-4.2)    Group C    3+1-2-/4.2V/100Us/70Hz (0.0-4.2)    Group B " "\"Baseline\":  C+1-2-/2.8V/120Us/70Hz    ---------------------Previous Programming---------------------------  2/2024  Group B Increasing freq 115 to SE \"lack of control\" on L side , increasing causing vision changes (white noise on a TV/artifact)--lowered PW to 110 and head still felt weird, 90 which was tolerated made this group C, kept B same settings    Higher PW in B makes his head feel weird.     Monopolar review  R GPi (L body)  C+0- SE 1.2--vision changes, speckles   C+1-SE 4.5 L cheek feeling like it wants to pull  C+2-SE 4.5 trouble speaking  C+3- no SE at 5.0     PLAN  Next visit can try C+1- if needed.      Final settings    Group C--same as B but with slightly higher freq 2/8/24  C+1-2- 2.8V/120Us/90Hz    Group B--settings made last visit  1/2/24  C+1-2-/2.8V/120Us/70Hz    Group D--new settings 2/8/24  C+2-3- 2.5/120/70   rsdd  Group A:   C+2-/3.3V/120us/70Hz    11/21/24:  Initial settings:  Group B \"Baseline\":  C+1-2-/2.8V/120Us/70Hz     Programming  Tried 0+1-2- but caused visual symptoms at low voltage     Made new group A \"Thanksgiv\"  3+1-2-/4.2V/120Us/70Hz      Still has group B \"baseline\" he came in with     Final settings    Group A \"Thanksgiv\"  3+1-2-/4.2V/120Us/70Hz (0.0-4.2)      Assessment/Plan   Mr. Yadiel Osborne is a 29 y.o. m here for follow up of bilateral Gpi DBS for his DYT1 generalized dystonia, initially implanted in [place] in 2008. He has had many years of excellent symptom control. He has exacerbation of LLE dystonia (he feels his gait is different/less fluid, sometimes other notice), however, not noted on exam. This is worsened by stress and anxiety and can talk himself out of it at times, likely more related to anxiety. He has good insight, is working with psychology and had NP testing for cognitive complaints thought to be d/t mood and sleep issues. For fatigue recently started CPAP x 21 days but not helpful.  He has a hx of OCD as a child, is now perseverating over issues with " "gait, does not want people to know he has dystonia or DBS--basically feels it is controlling every aspect of his life and most of what he thinks about (despite marked improvement in symptoms overall). He gets very anxious the day before apts or if he has to walk in front of others. He could try CBT which may help, as he notes anxiety worsens symptoms.  He has SI without active plan, good support system. He sees psych NP for meds and psychology weekly.    For new DBS settings, he tried to go back to baseline group but noted worsening of sx for a few hrs, he will try once more. He is also not sure if feeling like LLE kicks out when he walks is a SE to Thanksgiv group so made a new group w/ same settings and lower PW he can also try to see if any difference.  He has difficulty telling if SE or worsening dystonia, discussed the difference, timing, etc.       Diagnoses and all orders for this visit:  DYT1 dystonia  -     Referral to Psychology; Future  Anxiety  -     Referral to Psychology; Future      #Cognitive behavioral therapy for anxiety     Consult sent, Please call [phone] ([phone]) to schedule an apt.     #For L Side    See again if baseline worsens symptoms, if it does, then Thansg[facility] is helping and you can go back there.     -I made group C with lower settings to see if helps with L leg swinging out---if it worsens it then we know higher settings are better and go back to [facility]    Once you pick a group, as long as comfortable and do not feel worse, stay there for about 4 weeks to see if benefit    #R side:   Again you have room to go down if needed or you can switch back to your original \"baseline\" group    #Follow up as scheduled with Dr. Posadas        Total time of 37 minutes for today's visit including chart review, of which 6 mins were spent with DBS programming as noted above-checking settings, stimulator events, energy level, and impedances and updating settings in the chart. Remaining " time with discussion of care/treatment options (31 min).      Bartolome Hoffmann, NP-C  Adult/Gerontological Nurse Practitioner   [facility], Department of Neurology  [facility] [place]  [facility]  [address] [id number]  Phone: [phone]  Fax: [phone]

## 2024-12-03 NOTE — PATIENT INSTRUCTIONS
"  #Cognitive behavioral therapy for anxiety     Consult sent, Please call [phone] ([phone]) to schedule an apt.     #For L Side    See again if baseline worsens symptoms, if it does, then Thansgiv is helping and you can go back there.     -I made group C with lower settings to see if helps with L leg swinging out---if it worsens it then we know higher settings are better [facility]    Once you pick a group, as long as comfortable and do not feel worse, stay there for about 4 weeks to see if benefit    #R side:   Again you have room to go down if needed or you can switch back to your original \"baseline\" group    #Follow up as scheduled with Dr. Katharina Hoffmann, NP-C  Adult/Gerontological Nurse Practitioner   [facility], Department of Neurology  Ne[facility] [place]  [facility]  [address] [place] [address]  Phone: [phone]  Fax: [phone]  "

## 2024-12-19 ENCOUNTER — TELEPHONE (OUTPATIENT)
Dept: NEUROLOGY | Facility: CLINIC | Age: 29
End: 2024-12-19

## 2024-12-19 ENCOUNTER — TELEMEDICINE (OUTPATIENT)
Dept: PRIMARY CARE | Facility: CLINIC | Age: 29
End: 2024-12-19
Payer: COMMERCIAL

## 2024-12-19 DIAGNOSIS — G24.1: Primary | ICD-10-CM

## 2024-12-19 PROCEDURE — 1036F TOBACCO NON-USER: CPT | Performed by: FAMILY MEDICINE

## 2024-12-19 PROCEDURE — 99213 OFFICE O/P EST LOW 20 MIN: CPT | Performed by: FAMILY MEDICINE

## 2024-12-19 NOTE — PROGRESS NOTES
Subjective   Patient ID: Yadiel Osborne is a 29 y.o. male who presents for Letter for School/Work (Workplace accomodation).  Patient is dealing with dystonia, and when having exacerbation, will need to miss work.  Had paperwork completed previously, but they want additional information regarding how often he may miss, if it is predictable, and if he can give advance notice.  They also need info regarding how much extra time is needed for tasks.  When he has exacerbation, he has an inability to control movement.  With that will get anxiety, and general fatigue.  Is hard to concentrate on other tasks, since he it trying to control movement.  Can be triggered by increased stress and anxiety.   Occasionally can happen at work, but often will wake up with a flare.  It may happen a couple times a month, on average.  If trying new medication, will increase anxiety which increased symptoms.  Currently trying to find better settings to adjust to the issue, and is pretty uncomfortable.  At work, if trying to manage a lot, or overburdened, it can be triggered.    Regarding extra time needed at work, it may be most helpful to have more time transitioning between tasks.  He thinks an additional 5-10 minutes would be good.        Review of Systems    Objective   There were no vitals taken for this visit.    Physical Exam  Constitutional:       Appearance: Normal appearance.   Neurological:      Mental Status: He is alert.   Psychiatric:         Mood and Affect: Mood normal.         Behavior: Behavior normal.           Assessment/Plan   Problem List Items Addressed This Visit       DYT1 dystonia - Primary

## 2024-12-19 NOTE — PATIENT INSTRUCTIONS
Reviewed additional ADA information that work is requesting, and form completed.  Will fax over today.  Follow up if additional information is needed.

## 2024-12-19 NOTE — TELEPHONE ENCOUNTER
"Yadiel Osborne called. He has been adjusting his DBS daily.  He said he got himself \"all screwed up\". He has been adjusting multiple times a day. When he doesn't see immediate results he adjust further. The lack of immediate results causes anxiety so therefore his symptoms are exacerbated by anxiety so he adjusts his DBS again.   He put himself back to \"B\" and the original settings don't seem to be sticking so he changed the settings again.  I told him to set the DBS to Program B and to stop pushing buttons and to leave the stimulator alone and let the stimulation settle. Please call back this afternoon with any further suggestions.  "

## 2025-01-07 NOTE — PROGRESS NOTES
[facility]   Neurosurgery    Diagnosis  Yadiel was seen today for consult.  Diagnoses and all orders for this visit:  Status post deep brain stimulator placement  -     Cancel: XR skull complete 4+ views; Future  DYT1 dystonia  -     Referral to Neurosurgery  Mechanical complication of deep brain stimulator, initial encounter (CMS-HCC)      Patient Discussion/Summary  1) Today we discussed all the areas of his discomfort you are having with current DBS system, both incisional discomfort on the right generator/extension cable and left DBS wire/extension cable, as well as the practical life annoyances related to recharging your DBS and traveling with DBS. As such, we discussed that it might be worthwhile to look at the placement/position of the wires with xrays of your head/neck/chest, which we can do today. In addition, I will reach out to [employer] to work with you on your recharging concerns.     2) We also discussed that, given your young age and how old your hardware is, it is not unreasonable to consider down the line replacing your DBS electrodes and generators with newer directional leads, either in the same position of the Gpi or a more optimal position, and connect them all to a single generator unit. This might help your quality of life and overall comfort, along with increase your ease of recharging. There is also a possible benefit of improved therapeutic effects of stimulation with newer leads and/or a modified lead placement. Thus, we can consider getting a brain MRI to assess your current lead position and move forward from there if this is something you wish to pursue.       Provider Impressions  29 y.o. right-handed male with DYT1 dystonia, with primarily left-sided foot and trunk dystonia with altered gait, s/p b/l Gpi DBS w/ MDT originally placed in 1/2008 in [place], with subsequent infection requiring explant and treatment with IV abx, and reimplanted in 4/2008. He transitioned his care  to  in 2013, and has undergone b/l DBS generator replacement in 7/2013, 11/2017, and most recently 11/30/21 with MDT Saida MAE, all by Dr. Maurice Trimble. Patient has had excellent control of his symtpoms with stimulation; however, over time has developed change in gait and sensation of L hemibody feeling less fluid/stiff. Patient was recently seen by Dr. Posadas for programming visit in 11/2024, where he reported superficiality and discomfort of his R chest IPG, and was referred here for further evaluation and treatment options.        Patient reports overall good relief of his dystonia symptoms with stim; though, he states he will occasionally feel that he can do better, but will have notably worse relief with making changes to his programs, and is also exacerbated with increases in his anxiety. He presents today to establish care, particularly due to having his device in for many years, and wants an impression on a few things. He notes that his right generator site is prominent, particularly at the right lateral edge, and also has a palpable wire over the type of the generator. He also states that more recently he has noticed that his left extension lead has become more taught and bothersome, particularly over the ear and into the scalp. Finally, he notes some mild irritation at the right neck from his right extension lead, but is not as bothersome.     We discussed all the areas of his discomfort with current system both incisional and discomfort in life with the annoyances of charging two sides, etc. As such, we discussed that it may be worthwhile down the line to replace the electrodes to directional leads, either in the some position or more optimal position, and connect the electrodes to a single rechargeable generator, either with mdt or a different platform. Alternatively, we could consider just replacing the left generator with a newer mdt generator that may improve his quality of life and allow us to  correct all of the protrusions that are currently bothering him. Either way, first, at this time, I would like skull/neck/chest x-rays to better assess all of the wires. I would also like an MRI of the brain for possible surgical planning, but this needs to be coordinated with neurology. Follow-up as needed to discuss results of imaging and next steps.     History of Present Illness  Chief Complaint:   Chief Complaint   Patient presents with    Consult     DBS          HPI: Yadiel Osborne is a 29 y.o. right-handed male with DYT1 dystonia, with primarily left-sided foot and trunk dystonia with altered gait, s/p b/l Gpi DBS w/ MDT originally placed in 1/2008 in [place], with subsequent infection requiring explant and treatment with IV abx, and reimplanted in 4/2008. He transitioned his care to  in 2013, and has undergone b/l DBS generator replacement in 7/2013, 11/2017, and most recently 11/30/21 with MDT Saida MAE, all by Dr. Maurice Trimble. Patient has had excellent control of his symtpoms with stimulation; however, over time has developed change in gait and sensation of L hemibody feeling less fluid/stiff. Patient was recently seen by Dr. Posadas for programming visit in 11/2024, where he reported superficiality and discomfort of his R chest IPG, and was referred here for further evaluation and treatment options.        Patient reports overall good relief of his dystonia symptoms with stim; though, he states he will occasionally feel that he can do better, but will have notably worse relief with making changes to his programs, and is also exacerbated with increases in his anxiety. He presents today to establish care, particularly due to having his device in for many years, and wants an impression on a few things. He notes that his right generator site is prominent, particularly at the right lateral edge, and also has a palpable wire over the type of the generator. He also states that more recently he has noticed  "that his left extension lead has become more taught and bothersome, particularly over the ear and into the scalp. Finally, he notes some mild irritation at the right neck from his right extension lead, but is not as bothersome.       Device Interrogation from Today's Visit:  Left Chest/Left Gpi  Device: Medtronic Activa RC (IPG replaced 11/30/21)  Battery: 100% (JOHANNA 11/2035)  Impedances: All OK  Settings:  Group A    L GPI:C+ 2-/2.7V/120us/60Hz    Right chest/Right Gpi  Device: Medtronic Activa RC (IPG replaced 11//30/21)   Battery: 100% (JOHANNA 11/2035)  Impedances: Abnormal impedance on monopolar contact 0 (2480), otherwise all OK  Settings:   Group B \"Baseline\":  C+1-2-/2.8V/120Us/70Hz       ROS: As noted in HPI.      Previous History  No past medical history on file.  Past Surgical History:   Procedure Laterality Date    OTHER SURGICAL HISTORY  07/24/2013    Brain Surgery    OTHER SURGICAL HISTORY  05/26/2021    Deep brain stimulation     Social History     Tobacco Use    Smoking status: Never     Passive exposure: Never    Smokeless tobacco: Never   Vaping Use    Vaping status: Never Used   Substance Use Topics    Alcohol use: Yes     Alcohol/week: 1.0 standard drink of alcohol     Types: 1 Glasses of wine per week     Comment: Patient Is a social Drinker    Drug use: Not Currently     No family history on file.  No Known Allergies  Current Outpatient Medications   Medication Instructions    albuterol (Ventolin HFA) 90 mcg/actuation inhaler 2 puffs, inhalation, Every 4 hours PRN    APPLE CIDER VINEGAR ORAL 1 capsule, Daily    cholecalciferol (Vitamin D-3) 125 mcg (5000 UT) capsule 1 capsule, Daily    escitalopram (LEXAPRO) 5 mg, Daily    MAGNESIUM GLYCINATE ORAL 1 capsule, As needed    NON FORMULARY Dried beef organs 3 capsules daily    sertraline (Zoloft) 25 mg tablet 1 tablet, Daily (0630)         Vitals  /68   Pulse 70   Resp 16   Ht 1.702 m (5' 7\")   Wt 75.3 kg (166 lb)   BMI 26.00 kg/m² "       Physical Exam  Well appearing, in no acute distress. All incisions well healed. Right generator site with prominent connector at right lateral edge, visible and palpable wire overlying the generator, and slightly loose in pocket, but without evidence of breakdown or erosion. Left and right extension leads mildly superficial, but without evidence of breakdown.     Dystonic symptoms extremely well controlled without e/o dystonia of limbs/neck/trunk on exam.    Results

## 2025-01-08 ENCOUNTER — OFFICE VISIT (OUTPATIENT)
Dept: NEUROSURGERY | Facility: HOSPITAL | Age: 30
End: 2025-01-08
Payer: COMMERCIAL

## 2025-01-08 ENCOUNTER — HOSPITAL ENCOUNTER (OUTPATIENT)
Dept: RADIOLOGY | Facility: HOSPITAL | Age: 30
Discharge: HOME | End: 2025-01-08
Payer: COMMERCIAL

## 2025-01-08 ENCOUNTER — APPOINTMENT (OUTPATIENT)
Dept: SLEEP MEDICINE | Facility: CLINIC | Age: 30
End: 2025-01-08
Payer: COMMERCIAL

## 2025-01-08 VITALS
RESPIRATION RATE: 16 BRPM | HEART RATE: 70 BPM | SYSTOLIC BLOOD PRESSURE: 116 MMHG | WEIGHT: 166 LBS | BODY MASS INDEX: 26.06 KG/M2 | DIASTOLIC BLOOD PRESSURE: 68 MMHG | HEIGHT: 67 IN

## 2025-01-08 VITALS
HEIGHT: 67 IN | WEIGHT: 166 LBS | HEART RATE: 79 BPM | SYSTOLIC BLOOD PRESSURE: 100 MMHG | DIASTOLIC BLOOD PRESSURE: 64 MMHG | BODY MASS INDEX: 26.06 KG/M2 | OXYGEN SATURATION: 97 %

## 2025-01-08 DIAGNOSIS — G47.19 EXCESSIVE DAYTIME SLEEPINESS: ICD-10-CM

## 2025-01-08 DIAGNOSIS — G47.33 OBSTRUCTIVE SLEEP APNEA SYNDROME: Primary | ICD-10-CM

## 2025-01-08 DIAGNOSIS — T85.190A: ICD-10-CM

## 2025-01-08 DIAGNOSIS — Z96.89 STATUS POST DEEP BRAIN STIMULATOR PLACEMENT: Primary | ICD-10-CM

## 2025-01-08 DIAGNOSIS — G24.1: ICD-10-CM

## 2025-01-08 DIAGNOSIS — R53.83 OTHER FATIGUE: ICD-10-CM

## 2025-01-08 DIAGNOSIS — Z96.89 STATUS POST DEEP BRAIN STIMULATOR PLACEMENT: ICD-10-CM

## 2025-01-08 DIAGNOSIS — G47.21 CIRCADIAN RHYTHM SLEEP DISORDER, DELAYED SLEEP PHASE TYPE: ICD-10-CM

## 2025-01-08 PROCEDURE — 99215 OFFICE O/P EST HI 40 MIN: CPT | Performed by: NEUROLOGICAL SURGERY

## 2025-01-08 PROCEDURE — 3008F BODY MASS INDEX DOCD: CPT | Performed by: NEUROLOGICAL SURGERY

## 2025-01-08 PROCEDURE — 99214 OFFICE O/P EST MOD 30 MIN: CPT | Performed by: PHYSICIAN ASSISTANT

## 2025-01-08 PROCEDURE — 1036F TOBACCO NON-USER: CPT | Performed by: PHYSICIAN ASSISTANT

## 2025-01-08 PROCEDURE — 99205 OFFICE O/P NEW HI 60 MIN: CPT | Performed by: NEUROLOGICAL SURGERY

## 2025-01-08 PROCEDURE — 3008F BODY MASS INDEX DOCD: CPT | Performed by: PHYSICIAN ASSISTANT

## 2025-01-08 PROCEDURE — G2211 COMPLEX E/M VISIT ADD ON: HCPCS | Performed by: PHYSICIAN ASSISTANT

## 2025-01-08 PROCEDURE — 95983 ALYS BRN NPGT PRGRMG 15 MIN: CPT | Performed by: NURSE PRACTITIONER

## 2025-01-08 PROCEDURE — 71045 X-RAY EXAM CHEST 1 VIEW: CPT

## 2025-01-08 RX ORDER — SERTRALINE HYDROCHLORIDE 25 MG/1
1 TABLET, FILM COATED ORAL
COMMUNITY
Start: 2024-12-14

## 2025-01-08 ASSESSMENT — SLEEP AND FATIGUE QUESTIONNAIRES
HOW LIKELY ARE YOU TO NOD OFF OR FALL ASLEEP WHILE SITTING QUIETLY AFTER LUNCH WITHOUT ALCOHOL: MODERATE CHANCE OF DOZING
HOW LIKELY ARE YOU TO NOD OFF OR FALL ASLEEP WHILE SITTING AND READING: MODERATE CHANCE OF DOZING
HOW LIKELY ARE YOU TO NOD OFF OR FALL ASLEEP WHILE SITTING AND TALKING TO SOMEONE: SLIGHT CHANCE OF DOZING
HOW LIKELY ARE YOU TO NOD OFF OR FALL ASLEEP WHILE WATCHING TV: MODERATE CHANCE OF DOZING
HOW LIKELY ARE YOU TO NOD OFF OR FALL ASLEEP WHEN YOU ARE A PASSENGER IN A CAR FOR AN HOUR WITHOUT A BREAK: HIGH CHANCE OF DOZING
HOW LIKELY ARE YOU TO NOD OFF OR FALL ASLEEP IN A CAR, WHILE STOPPED FOR A FEW MINUTES IN TRAFFIC: WOULD NEVER DOZE
ESS-CHAD TOTAL SCORE: 15
SITING INACTIVE IN A PUBLIC PLACE LIKE A CLASS ROOM OR A MOVIE THEATER: MODERATE CHANCE OF DOZING
HOW LIKELY ARE YOU TO NOD OFF OR FALL ASLEEP WHILE LYING DOWN TO REST IN THE AFTERNOON WHEN CIRCUMSTANCES PERMIT: HIGH CHANCE OF DOZING

## 2025-01-08 ASSESSMENT — PAIN SCALES - GENERAL: PAINLEVEL_OUTOF10: 0-NO PAIN

## 2025-01-08 NOTE — PROGRESS NOTES
"[facility]  Followup Visit Note    HISTORY OF PRESENT ILLNESS   Yadiel Osborne is a 29 y.o. male who presents to a [facility] for followup.       Current History    Sleeping inconsistently with CPAP, often removing it in middle of night due to annoyance. Reports having head cold 2 weeks ago and avoided using CPAP during that time to prevent bacterial contamination. Finding it challenging to use consistently as it adds to existing medical routines.    Unclear about benefits when using CPAP for 5-6 hours. Natural night owl tendency, typically going to bed around midnight or later. Reports difficulty falling asleep earlier, noting if going to bed at 8-9 PM would wake at midnight and be awake for hours. Work schedule varies with occasional early starts at 6-7 AM.    Using P10 nasal pillows mask with some nasal irritation noted. Reports mask discomfort and issues with tubing position when changing sleep positions. No issues with pressure or humidity noted. Initial difficulty breathing against pressure has improved. More comfortable with CPAP after being on for a while versus initial use.      PAP Adherence:      Sleep Scales:  ESS: 15     REVIEW OF SYSTEMS    All other systems negative      PHYSICAL EXAM     VITAL SIGNS: /64   Pulse 79   Ht 1.702 m (5' 7\")   Wt 75.3 kg (166 lb)   SpO2 97%   BMI 26.00 kg/m²      PREVIOUS WEIGHTS:  Wt Readings from Last 3 Encounters:   01/08/25 75.3 kg (166 lb)   12/03/24 77.1 kg (170 lb)   11/21/24 77.6 kg (171 lb)       Constitutional: Alert and oriented, cooperative, no obvious distress   HEENT: Non icteric or anemic, EOM WNL bilaterally   Neck: Supple, no JVD, no goiter, no adenopathy, no rigidity  Extremities: No clubbing, no LL edema   Neuromuscular: Cranial nerves grossly intact, no focal deficits     RESULTS/DATA     No results found for: \"IRON\", \"TRANSFERRIN\", \"IRONSAT\", \"TIBC\", " "\"FERRITIN\"      ASSESSMENT/PLAN     Mr. Osborne is a 29 y.o. male and returns in followup for the following issues:    OBSTRUCTIVE SLEEP APNEA / EDS  - Mild sleep apnea with inconsistent CPAP usage  - Good compliance noted until December  - Recent decrease in usage due to travel and illness  - Pressure increased slightly to improve initial comfort  - EPR setting adjusted, response changed from soft  - Sample N30i mask provided to address comfort issues with current P10 mask  - Fordyce score 9 indicating significant daytime sleepiness    Discussed possible late phase sleep disorder. Counseled on sleep hygiene:  - Recommended low dose melatonin (0.3-1mg) taken 4-5 hours before desired bedtime  - Advised minimizing light exposure after 7 PM  - Encouraged maximizing morning light exposure  - Discussed impact of screen time and technology use on sleep    If no improvement with new mask and consistent usage, consider:  - MSLT testing to evaluate daytime sleepiness  - Returning CPAP if no benefit noted  - Follow up on 02/17/2025 if still in area         "

## 2025-01-08 NOTE — PATIENT INSTRUCTIONS
Good seeing you today    Pressure settings adjusted and new N30i mask provided for trial. Continue regular cleaning of equipment:  - Tubin-2 times weekly with soap and water  - Reservoir: vinegar and water solution recommended    Please let us know if you have any other issues with pressure, humidity, or mask.    Follow up in one month if desired before planned relocation

## 2025-01-15 ENCOUNTER — APPOINTMENT (OUTPATIENT)
Dept: PRIMARY CARE | Facility: CLINIC | Age: 30
End: 2025-01-15
Payer: COMMERCIAL

## 2025-01-16 ENCOUNTER — PROCEDURE VISIT (OUTPATIENT)
Dept: NEUROLOGY | Facility: HOSPITAL | Age: 30
End: 2025-01-16
Payer: COMMERCIAL

## 2025-01-16 VITALS
HEIGHT: 67 IN | BODY MASS INDEX: 26.06 KG/M2 | SYSTOLIC BLOOD PRESSURE: 111 MMHG | HEART RATE: 67 BPM | DIASTOLIC BLOOD PRESSURE: 72 MMHG | WEIGHT: 166 LBS

## 2025-01-16 DIAGNOSIS — G24.1: Primary | ICD-10-CM

## 2025-01-16 PROCEDURE — 99214 OFFICE O/P EST MOD 30 MIN: CPT | Mod: GC | Performed by: PSYCHIATRY & NEUROLOGY

## 2025-01-16 PROCEDURE — 95983 ALYS BRN NPGT PRGRMG 15 MIN: CPT | Performed by: PSYCHIATRY & NEUROLOGY

## 2025-01-16 PROCEDURE — 99214 OFFICE O/P EST MOD 30 MIN: CPT | Performed by: PSYCHIATRY & NEUROLOGY

## 2025-01-16 PROCEDURE — 64644 CHEMODENERV 1 EXTREM 5/> MUS: CPT | Performed by: PSYCHIATRY & NEUROLOGY

## 2025-01-16 RX ORDER — BACLOFEN 10 MG/1
10 TABLET ORAL 2 TIMES DAILY
Qty: 60 TABLET | Refills: 11 | Status: SHIPPED | OUTPATIENT
Start: 2025-01-16 | End: 2026-01-16

## 2025-01-16 NOTE — PROGRESS NOTES
"[facility]     Follow up visit for DYT1 generalized dystonia       Subjective     Yadiel Osborne is a right handed  29 y.o. year old man who presents for scheduled follow up for DYT1 generalized dystonia, s/p bilateral GPi DBS (2008).     Last seen on 12/3/24, at that time he reported worsening symptoms after while on the Thanksgiving group, and he tried different setting, but eventually returned to his baseline group. Since then, he has noticed improvement in his dystonia and slowing compared to the new groups.     He has been on group B since 12/18. He was able to work, where he stays on his feet for 8 hrs each day. Denied any limitations in his work or ambulation. He travelled to [facility] during New Years where he was able to walk for 56 miles in total over few days. He reported feeling soreness in his left leg after that, but denied falls or freezing of gait. Overall, he is content with his current program settings.       Vitals:    01/16/25 0813   BP: 111/72   Pulse: 67   Weight: 75.3 kg (166 lb)   Height: 1.702 m (5' 7\")     Objective   Pleasant young man, cooperative, and comfortable.     Neurological Exam  No abnormal movements noted.   No dystonia, tremors, or posturing with upper extremities.  No dystonia w handwriting.  Gait is narrow based, with normal speed. Mild inversion dystonia of his left foot while walking.       Procedure:  L IPG:  Active RC  Battery level 100%  JOHANNA Nov 2035  Impedances- normal    L GPi: C+2-/2.7/120/60Hz Pt  limits: 0-3.5V  TH impedance: 719/ 3.7mA      R IPG:   %  Activa RC.   JOHANNA Nov 2035  Electrode impedances wnl    Active Group B:  R GPi: C+1-2-/2.8V/120us/70Hz   Th impedance 557/4.9mA       Group A \"Thanksgiv\"  3+1-2-/4.2V/120Us/70Hz  (Pt : 0-4.2)      Assessment/Plan   Problem List Items Addressed This Visit       DYT1 dystonia - Primary    Relevant Medications    baclofen (Lioresal) 10 mg tablet     [place] is a 29 y.o. m here for " follow up of bilateral GPi DBS for his DYT1 generalized dystonia, initially implanted in [place] in 2008. He has had many years of excellent symptom control. He has exacerbation of LLE dystonia (he feels his gait is different/less fluid, sometimes other notice), however, very subtle on exam This is worsened by stress and anxiety and can talk himself out of it at times, likely more related to anxiety.      For DBS settings, he tried new setting (Thanksgiving group), with worsening in his symptoms. He is back to his baseline group, no noticeable dystonia and is able to perform his daily activities. Occasionally feels his left leg is off, worsens with anxiety. His right arm feels slower, and continues to use his left hand for certain activities, such as brushing his teeth. Bradykinesia can be induced w GPi DBS, however given his excellent symptom control and lack of perceived benefit with changes, we will not make adjustments today apart from making sure he has range to increase or decrease stimulation for his R hemibody to experiment if slowness improved w less or more stimulation - objectively no dystonia was noted w handwriting and no bradykinesia seen.  For his Left foot dystonia we discussed low dose artane, baclofen or botox injections.     Plan:   - Start Baclofen 10 mg BID   - DBS programing this visit   - Follow up in 6 months     Irma Nagy MD  Neurology Resident PGY4      I saw and evaluated the patient. I personally obtained the key and critical portions of the history and physical exam or was physically present for key and critical portions performed by the resident/fellow. I reviewed the resident/fellow's documentation and discussed the patient with the resident/fellow. I agree with the resident/fellow's medical decision making as documented in the note. I personally performed the DBS interrogation and programming.     Arabella Posadas MD         For the Evaluation and Management of this patient, the  level of Medical Decision Making for this visit was determined based on the following:    The level of COMPLEXITY AND NUMBER OF PROBLEMS ADDRESSED was [MODERATE] as determined by:     MODERATE:    one chronic illnesses with exacerbation, progression or side effect of Rx.      The AMOUNT/COMPLEXITY OF DATA TO REVIEW (reviewed, ordered or call for) was [ LIMITED] as determined by:    LIMITED:  my review of prior external notes from a unique source.      The level of RISK OF COMPLICATIONS was [ MODERATE,] as determined by:    MODERATE:  prescription drug management.      Thus, the level of medical decision making (based on the lower of the two highest elements) was determined to be MODERATE,]. Therefore the appropriate E/M code for this encounter is [37626,

## 2025-01-16 NOTE — LETTER
"January 16, 2025     Cathi Kiran MD  [address]  [address]  [facility] [address]    Patient: Yadiel Osborne   YOB: 1995   Date of Visit: 1/16/2025       Dear Dr. Cathi Kiran MD:    Thank you for referring Yadiel Osborne to me for evaluation. Below are my notes for this consultation.  If you have questions, please do not hesitate to call me. I look forward to following your patient along with you.       Sincerely,     Arabella Posadas MD      CC: No Recipients  ______________________________________________________________________________________    [facility]     Follow up visit for DYT1 generalized dystonia       Subjective     Yadiel Osborne is a right handed  29 y.o. year old man who presents for scheduled follow up for DYT1 generalized dystonia, s/p bilateral GPi DBS (2008).     Last seen on 12/3/24, at that time he reported worsening symptoms after while on the Thanksgiving group, and he tried different setting, but eventually returned to his baseline group. Since then, he has noticed improvement in his dystonia and slowing compared to the new groups.     He has been on group B since 12/18. He was able to work, where he stays on his feet for 8 hrs each day. Denied any limitations in his work or ambulation. He travelled to [facility] during New Years where he was able to walk for 56 miles in total over few days. He reported feeling soreness in his left leg after that, but denied falls or freezing of gait. Overall, he is content with his current program settings.       Vitals:    01/16/25 0813   BP: 111/72   Pulse: 67   Weight: 75.3 kg (166 lb)   Height: 1.702 m (5' 7\")     Objective   Pleasant young man, cooperative, and comfortable.     Neurological Exam  No abnormal movements noted.   No dystonia, tremors, or posturing with upper extremities.  No dystonia w handwriting.  Gait is narrow based, with normal speed. Mild inversion dystonia of his left foot while walking. " "      Procedure:  L IPG:  Active RC  Battery level 100%  JOHNANA Nov 2035  Impedances- normal    L GPi: C+2-/2.7/120/60Hz Pt  limits: 0-3.5V  TH impedance: 719/ 3.7mA      R IPG:   %  Activa RC.   JOHANNA Nov 2035  Electrode impedances wnl    Active Group B:  R GPi: C+1-2-/2.8V/120us/70Hz   Th impedance 557/4.9mA       Group A \"Thanksgiv\"  3+1-2-/4.2V/120Us/70Hz  (Pt : 0-4.2)      Assessment/Plan   Problem List Items Addressed This Visit       DYT1 dystonia - Primary    Relevant Medications    baclofen (Lioresal) 10 mg tablet     [place] is a 29 y.o. m here for follow up of bilateral GPi DBS for his DYT1 generalized dystonia, initially implanted in [place] in 2008. He has had many years of excellent symptom control. He has exacerbation of LLE dystonia (he feels his gait is different/less fluid, sometimes other notice), however, very subtle on exam This is worsened by stress and anxiety and can talk himself out of it at times, likely more related to anxiety.      For DBS settings, he tried new setting (Thanksgiving group), with worsening in his symptoms. He is back to his baseline group, no noticeable dystonia and is able to perform his daily activities. Occasionally feels his left leg is off, worsens with anxiety. His right arm feels slower, and continues to use his left hand for certain activities, such as brushing his teeth. Bradykinesia can be induced w GPi DBS, however given his excellent symptom control and lack of perceived benefit with changes, we will not make adjustments today apart from making sure he has range to increase or decrease stimulation for his R hemibody to experiment if slowness improved w less or more stimulation - objectively no dystonia was noted w handwriting and no bradykinesia seen.  For his Left foot dystonia we discussed low dose artane, baclofen or botox injections.     Plan:   - Start Baclofen 10 mg BID   - DBS programing this visit   - Follow up in 6 months     Irma" MD Yakov  Neurology Resident PGY4      I saw and evaluated the patient. I personally obtained the key and critical portions of the history and physical exam or was physically present for key and critical portions performed by the resident/fellow. I reviewed the resident/fellow's documentation and discussed the patient with the resident/fellow. I agree with the resident/fellow's medical decision making as documented in the note. I personally performed the DBS interrogation and programming.     Arabella Posadas MD         For the Evaluation and Management of this patient, the level of Medical Decision Making for this visit was determined based on the following:    The level of COMPLEXITY AND NUMBER OF PROBLEMS ADDRESSED was [MODERATE] as determined by:     MODERATE:    one chronic illnesses with exacerbation, progression or side effect of Rx.      The AMOUNT/COMPLEXITY OF DATA TO REVIEW (reviewed, ordered or call for) was [ LIMITED] as determined by:    LIMITED:  my review of prior external notes from a unique source.      The level of RISK OF COMPLICATIONS was [ MODERATE,] as determined by:    MODERATE:  prescription drug management.      Thus, the level of medical decision making (based on the lower of the two highest elements) was determined to be MODERATE,]. Therefore the appropriate E/M code for this encounter is [04833,

## 2025-01-21 NOTE — PROGRESS NOTES
"Subjective   Patient ID: Yadiel Osborne is a 29 y.o. male    HPI  29 y.o. male who presents to [facility] for penile/testicular pain. He was referred by his PCP Dr. Kiran. He reports right testicular pain with left testicular discomfort. He feels some weakness in his pelvis floor when tightening floor muscles. He reports some \"chunks\" in his semen. He is currently sexually active with a female. He is not currently trying to conceive. He denies any erectile dysfunction. He does note some decreased libido, however he believes it is due to his stress. He denies any urinary symptoms. He denies any pain at the base of penis and in the prostate region during physical exam today.       The most recent Testosterone, conducted on 08/26/2024, revealed:  479 ng/dL       Review of Systems    All systems were reviewed. Anything negative was noted in the HPI.    Objective   Physical Exam  Genitourinary:     Pubic Area: No rash.       Penis: Normal and circumcised. No hypospadias.       Testes:         Right: Mass, tenderness, swelling, testicular hydrocele or varicocele not present. Right testis is descended.         Left: Mass, tenderness, swelling, testicular hydrocele or varicocele not present. Left testis is descended.      Epididymis:      Right: Not inflamed or enlarged. No mass or tenderness.      Left: Not inflamed or enlarged. No mass or tenderness.         General: Well developed, well nourished, alert and cooperative, appears in no acute distress   Eyes: Non-injected conjunctiva, sclera clear, no proptosis   Cardiac: Extremities are warm and well perfused. No edema, cyanosis or pallor   Lungs: Breathing is easy, non-labored. Speaking in clear and complete sentences. Normal diaphragmatic movement   MSK: Ambulatory with steady gait, unassisted   Neuro: Alert and oriented to person, place, and time   Psych: Demonstrates good judgment and reason, without hallucinations, abnormal affect or abnormal behaviors   Skin: No obvious " lesions, no rashes       No CVA tenderness bilaterally   No suprapubic pain or discomfort       No past medical history on file.      Past Surgical History:   Procedure Laterality Date    OTHER SURGICAL HISTORY  07/24/2013    Brain Surgery    OTHER SURGICAL HISTORY  05/26/2021    Deep brain stimulation         Assessment/Plan   Right Orchitis    29 y.o. male who presents for the above condition, We discussed empiric treatment with an anti-inflammatory in the form of NSAIDs twice daily for 4 days each. We discussed the risk, benefits, adverse events, side effects of the medications, he verbalized understanding and would like to proceed. We also discussed lifestyle modifications in the form of scrotal elevation, ice packing, and frequent ejaculation.    - Supportive care: Advise the patient to wear a jock strap or tight underwear for the next six weeks to support the testicle and reduce inflammation.  - Ice therapy: Instruct the patient to apply an ice pack or a bag of frozen peas wrapped in a thin towel to the affected area for 40 minutes before and after work.  - Ejaculation: Recommend frequent ejaculation (at least three times a week) to help reduce inflammation.  - Medication: Advise the patient to take Advil or ibuprofen, two tablets in the morning and two at night for three to four days, with food.    Plan:  - Scrotal US  - Follow-up in 6 weeks with results  - Referred to Pelvic Floor Physical Therapy    E&M visit today is associated with current or anticipated ongoing medical care services related to a patient's single, serious condition or a complex condition.     1/22/2025    Scribe Attestation  By signing my name below, IDemian Scribe attest that this documentation has been prepared under the direction and in the presence of Dr. Armond He.

## 2025-01-22 ENCOUNTER — OFFICE VISIT (OUTPATIENT)
Dept: UROLOGY | Facility: HOSPITAL | Age: 30
End: 2025-01-22
Payer: COMMERCIAL

## 2025-01-22 DIAGNOSIS — N45.2 ORCHITIS: Primary | ICD-10-CM

## 2025-01-22 PROCEDURE — 99214 OFFICE O/P EST MOD 30 MIN: CPT | Performed by: STUDENT IN AN ORGANIZED HEALTH CARE EDUCATION/TRAINING PROGRAM

## 2025-01-22 PROCEDURE — 99204 OFFICE O/P NEW MOD 45 MIN: CPT | Performed by: STUDENT IN AN ORGANIZED HEALTH CARE EDUCATION/TRAINING PROGRAM

## 2025-01-28 ENCOUNTER — APPOINTMENT (OUTPATIENT)
Dept: RADIOLOGY | Facility: HOSPITAL | Age: 30
End: 2025-01-28
Payer: COMMERCIAL

## 2025-01-30 ENCOUNTER — OFFICE VISIT (OUTPATIENT)
Dept: URGENT CARE | Age: 30
End: 2025-01-30
Payer: COMMERCIAL

## 2025-01-30 VITALS
DIASTOLIC BLOOD PRESSURE: 66 MMHG | WEIGHT: 165 LBS | RESPIRATION RATE: 18 BRPM | BODY MASS INDEX: 25.9 KG/M2 | OXYGEN SATURATION: 98 % | SYSTOLIC BLOOD PRESSURE: 105 MMHG | HEART RATE: 68 BPM | HEIGHT: 67 IN | TEMPERATURE: 98 F

## 2025-01-30 DIAGNOSIS — R68.89 FLU-LIKE SYMPTOMS: Primary | ICD-10-CM

## 2025-01-30 LAB
POC RAPID INFLUENZA A: NEGATIVE
POC RAPID INFLUENZA B: NEGATIVE
POC RAPID STREP: NEGATIVE
POC SARS-COV-2 AG BINAX: NORMAL

## 2025-01-30 ASSESSMENT — ENCOUNTER SYMPTOMS
RHINORRHEA: 1
DIARRHEA: 1
APPETITE CHANGE: 1
COUGH: 1
HEADACHES: 1
NAUSEA: 1
CHILLS: 1
SORE THROAT: 1
FEVER: 1

## 2025-01-30 NOTE — PROGRESS NOTES
Subjective   Patient ID: Yadiel Osborne is a 29 y.o. male. They present today with a chief complaint of Cough, Sore Throat, Headache, Fever, and Nasal Congestion (X 6 day).    History of Present Illness  Pt presents with illness x 6 days. Overall feeling much better. Sx include fever tmax of 102.8, cough, runny nose, sore throat, chills, headache, diarrhea/nausea, decreased appetite, fatigue. Last fever was 2 days ago. All sx resolved with exception of cough and sore throat. Co-workers were sick with similar sx. Denies cp, sob, wheezing, abd pain, vomiting, blood in the stool, urinary sx, ear pain, trouble swallowing.       History provided by:  Patient  Cough  Associated symptoms include chills, a fever, headaches, rhinorrhea and a sore throat.   Sore Throat   Associated symptoms include coughing, diarrhea and headaches.   Headache  Associated symptoms: cough, diarrhea, fever, nausea and sore throat    Fever   Associated symptoms include coughing, diarrhea, headaches, nausea and a sore throat.       Past Medical History  Allergies as of 01/30/2025    (No Known Allergies)       (Not in a hospital admission)       History reviewed. No pertinent past medical history.    Past Surgical History:   Procedure Laterality Date    OTHER SURGICAL HISTORY  07/24/2013    Brain Surgery    OTHER SURGICAL HISTORY  05/26/2021    Deep brain stimulation        reports that he has never smoked. He has never been exposed to tobacco smoke. He has never used smokeless tobacco. He reports current alcohol use of about 1.0 standard drink of alcohol per week. He reports that he does not currently use drugs.    Review of Systems  Review of Systems   Constitutional:  Positive for appetite change, chills and fever.   HENT:  Positive for rhinorrhea and sore throat.    Respiratory:  Positive for cough.    Gastrointestinal:  Positive for diarrhea and nausea.   Neurological:  Positive for headaches.                                  Objective   "  Vitals:    01/30/25 1317   BP: 105/66   Pulse: 68   Resp: 18   Temp: 36.7 °C (98 °F)   SpO2: 98%   Weight: 74.8 kg (165 lb)   Height: 1.702 m (5' 7\")     No LMP for male patient.    Physical Exam  Vitals and nursing note reviewed.   Constitutional:       General: He is not in acute distress.     Appearance: Normal appearance. He is not ill-appearing, toxic-appearing or diaphoretic.   HENT:      Head: Normocephalic and atraumatic.      Right Ear: Tympanic membrane, ear canal and external ear normal.      Left Ear: Tympanic membrane, ear canal and external ear normal.      Nose: Nose normal.      Mouth/Throat:      Lips: Pink.      Mouth: Mucous membranes are moist. No angioedema.      Dentition: Normal dentition. No dental abscesses.      Tongue: No lesions.      Palate: No mass.      Pharynx: Oropharynx is clear. Uvula midline. No pharyngeal swelling, oropharyngeal exudate, posterior oropharyngeal erythema, uvula swelling or postnasal drip.      Tonsils: No tonsillar exudate or tonsillar abscesses.      Comments: No uvular edema or deviation. No tonsillar edema, asymmetry, exudates or evidence of PTA. No trismus drooling or stridor. Speaking in full and clear sentences. Airway is patent. Tolerating oral secretions. No dental abnormality. No neck swelling. No sublingual or submandibular induration edema or tenderness.   Cardiovascular:      Rate and Rhythm: Normal rate and regular rhythm.      Pulses: Normal pulses.      Heart sounds: No murmur heard.  Pulmonary:      Effort: Pulmonary effort is normal. No respiratory distress.      Breath sounds: No wheezing, rhonchi or rales.   Abdominal:      General: Bowel sounds are normal.      Palpations: Abdomen is soft.      Tenderness: There is no abdominal tenderness. There is no guarding or rebound.   Neurological:      Mental Status: He is alert.         Procedures    Point of Care Test & Imaging Results from this visit  Results for orders placed or performed in visit " on 01/30/25   POCT Covid-19 Rapid Antigen   Result Value Ref Range    POC DONATO-COV-2 AG  Presumptive negative test for SARS-CoV-2 (no antigen detected)     Presumptive negative test for SARS-CoV-2 (no antigen detected)   POCT Influenza A/B manually resulted   Result Value Ref Range    POC Rapid Influenza A Negative Negative    POC Rapid Influenza B Negative Negative   POCT rapid strep A manually resulted   Result Value Ref Range    POC Rapid Strep Negative Negative      No results found.    Diagnostic study results (if any) were reviewed by Annia Enriquez PA-C.    Assessment/Plan   Allergies, medications, history, and pertinent labs/EKGs/Imaging reviewed by Annia Enriquez PA-C.     Medical Decision Making  Suspect viral etiology  Given that patient is feeling much better, advised continued supportive care.   Pt declined rx for tessalon  Lungs are clear, pt afebrile, vitals otherwise stable lower concern for pneumonia or other bacterial process requiring abx at  this time  Return ,follow up with pcp prn  ED precautions discussed     Orders and Diagnoses  Diagnoses and all orders for this visit:  Flu-like symptoms  -     POCT Covid-19 Rapid Antigen  -     POCT Influenza A/B manually resulted  -     POCT rapid strep A manually resulted      Medical Admin Record      Patient disposition: Home    Electronically signed by Annia Enriquez PA-C  1:44 PM

## 2025-01-30 NOTE — PATIENT INSTRUCTIONS
Please follow up with your primary provider or return to urgent care within one week if symptoms do not improve.  You may schedule an appointment online at [email or web address] or call [phone]. Go to the Emergency Department if symptoms significantly worsen or if you develop symptoms including but not limited to chest pain or shortness of breath.

## 2025-02-03 DIAGNOSIS — J45.20 MILD INTERMITTENT COLD-INDUCED ASTHMA WITHOUT COMPLICATION (HHS-HCC): ICD-10-CM

## 2025-02-03 RX ORDER — ALBUTEROL SULFATE 90 UG/1
2 INHALANT RESPIRATORY (INHALATION) EVERY 4 HOURS PRN
Qty: 8 G | Refills: 1 | Status: SHIPPED | OUTPATIENT
Start: 2025-02-03 | End: 2026-02-03

## 2025-02-04 ENCOUNTER — HOSPITAL ENCOUNTER (OUTPATIENT)
Dept: RADIOLOGY | Facility: CLINIC | Age: 30
Discharge: HOME | End: 2025-02-04
Payer: COMMERCIAL

## 2025-02-04 DIAGNOSIS — N45.2 ORCHITIS: ICD-10-CM

## 2025-02-04 PROCEDURE — 93975 VASCULAR STUDY: CPT

## 2025-02-04 PROCEDURE — 76870 US EXAM SCROTUM: CPT | Performed by: RADIOLOGY

## 2025-02-04 NOTE — PROGRESS NOTES
"Subjective   Patient ID: Yadiel Osborne is a 29 y.o. male    HPI  29 y.o. male who presents for a follow-up visit with penile/testicular pain. He was referred by his PCP Dr. Kiran. He reports right testicular pain with left testicular discomfort. He feels some weakness in his pelvis floor when tightening floor muscles. He reports some \"chunks\" in his semen. He is currently sexually active with a female. He is not currently trying to conceive. He denies any erectile dysfunction. He does note some decreased libido, however he believes it is due to his stress. He denies any urinary symptoms. He denies any pain at the base of penis and in the prostate region during physical exam today.          Review of Systems    All systems were reviewed. Anything negative was noted in the HPI.    Objective   Physical Exam  Genitourinary:     Pubic Area: No rash.       Penis: Normal and circumcised. No hypospadias.       Testes:         Right: Mass, tenderness, swelling, testicular hydrocele or varicocele not present. Right testis is descended.         Left: Mass, tenderness, swelling, testicular hydrocele or varicocele not present. Left testis is descended.      Epididymis:      Right: Not inflamed or enlarged. No mass or tenderness.      Left: Not inflamed or enlarged. No mass or tenderness.         General: Well developed, well nourished, alert and cooperative, appears in no acute distress   Eyes: Non-injected conjunctiva, sclera clear, no proptosis   Cardiac: Extremities are warm and well perfused. No edema, cyanosis or pallor   Lungs: Breathing is easy, non-labored. Speaking in clear and complete sentences. Normal diaphragmatic movement   MSK: Ambulatory with steady gait, unassisted   Neuro: Alert and oriented to person, place, and time   Psych: Demonstrates good judgment and reason, without hallucinations, abnormal affect or abnormal behaviors   Skin: No obvious lesions, no rashes       No CVA tenderness bilaterally   No " suprapubic pain or discomfort       No past medical history on file.      Past Surgical History:   Procedure Laterality Date    OTHER SURGICAL HISTORY  07/24/2013    Brain Surgery    OTHER SURGICAL HISTORY  05/26/2021    Deep brain stimulation         Assessment/Plan   Right Orchitis improving     29 y.o. male who presents for the above condition, We discussed empiric treatment with an anti-inflammatory in the form of NSAIDs twice daily for 4 days each. We discussed the risk, benefits, adverse events, side effects of the medications, he verbalized understanding and would like to proceed. We also discussed lifestyle modifications in the form of scrotal elevation, ice packing, and frequent ejaculation.    - Supportive care: Advise the patient to wear a jock strap or tight underwear for the next six weeks to support the testicle and reduce inflammation.  - Ice therapy: Instruct the patient to apply an ice pack or a bag of frozen peas wrapped in a thin towel to the affected area for 40 minutes before and after work.  - Ejaculation: Recommend frequent ejaculation (at least three times a week) to help reduce inflammation.  - Medication: Advise the patient to take Advil or ibuprofen, two tablets in the morning and two at night for three to four days, with food.    Plan:  - FU PRN      E&M visit today is associated with current or anticipated ongoing medical care services related to a patient's single, serious condition or a complex condition.     2/5/2025    Scribe Attestation  By signing my name below, Demian GONZALEZ Scribe attest that this documentation has been prepared under the direction and in the presence of Dr. Armond He.

## 2025-02-05 ENCOUNTER — OFFICE VISIT (OUTPATIENT)
Dept: UROLOGY | Facility: HOSPITAL | Age: 30
End: 2025-02-05
Payer: COMMERCIAL

## 2025-02-05 DIAGNOSIS — N50.819 PAIN IN TESTICLE, UNSPECIFIED LATERALITY: Primary | ICD-10-CM

## 2025-02-05 PROCEDURE — 99214 OFFICE O/P EST MOD 30 MIN: CPT | Performed by: STUDENT IN AN ORGANIZED HEALTH CARE EDUCATION/TRAINING PROGRAM

## 2025-02-05 PROCEDURE — 1036F TOBACCO NON-USER: CPT | Performed by: STUDENT IN AN ORGANIZED HEALTH CARE EDUCATION/TRAINING PROGRAM

## 2025-02-12 ENCOUNTER — TELEPHONE (OUTPATIENT)
Dept: UROLOGY | Facility: HOSPITAL | Age: 30
End: 2025-02-12
Payer: COMMERCIAL

## 2025-02-12 DIAGNOSIS — N50.819 PAIN IN TESTICLE, UNSPECIFIED LATERALITY: Primary | ICD-10-CM

## 2025-02-12 NOTE — TELEPHONE ENCOUNTER
----- Message from Armond He sent at 2/12/2025 12:46 PM EST -----  Please lets order a renal US because he had a right varicocele.  ----- Message -----  From: Tanna, Radiology Results In  Sent: 2/5/2025   1:37 PM EST  To: Armond He MD [facility]

## 2025-02-14 ENCOUNTER — HOSPITAL ENCOUNTER (OUTPATIENT)
Dept: RADIOLOGY | Facility: CLINIC | Age: 30
Discharge: HOME | End: 2025-02-14
Payer: COMMERCIAL

## 2025-02-14 DIAGNOSIS — N50.819 PAIN IN TESTICLE, UNSPECIFIED LATERALITY: ICD-10-CM

## 2025-02-14 PROCEDURE — 76770 US EXAM ABDO BACK WALL COMP: CPT

## 2025-02-19 ENCOUNTER — PATIENT MESSAGE (OUTPATIENT)
Dept: UROLOGY | Facility: HOSPITAL | Age: 30
End: 2025-02-19
Payer: COMMERCIAL

## 2025-02-20 ENCOUNTER — TELEPHONE (OUTPATIENT)
Dept: UROLOGY | Facility: HOSPITAL | Age: 30
End: 2025-02-20
Payer: COMMERCIAL

## 2025-02-20 DIAGNOSIS — N28.9 LESION OF RIGHT NATIVE KIDNEY: Primary | ICD-10-CM

## 2025-02-20 NOTE — TELEPHONE ENCOUNTER
----- Message from Armond He sent at 2/20/2025 10:19 AM EST -----  MRI renal with and without contrast plz  ----- Message -----  From: Interface, Radiology Results In  Sent: 2/15/2025   3:01 PM EST  To: Armond He MD [facility]

## 2025-03-07 ENCOUNTER — TELEPHONE (OUTPATIENT)
Dept: UROLOGY | Facility: HOSPITAL | Age: 30
End: 2025-03-07
Payer: COMMERCIAL

## 2025-03-07 NOTE — TELEPHONE ENCOUNTER
Spoke with patient who is in [place] now at length about MRI that has been ordered.  He will find out if he can have the MRI due to the implants in his chest. he will update us.    Jazlyn Martínez LPN